# Patient Record
Sex: MALE | Race: WHITE | HISPANIC OR LATINO | Employment: FULL TIME | URBAN - METROPOLITAN AREA
[De-identification: names, ages, dates, MRNs, and addresses within clinical notes are randomized per-mention and may not be internally consistent; named-entity substitution may affect disease eponyms.]

---

## 2021-07-31 ENCOUNTER — HOSPITAL ENCOUNTER (INPATIENT)
Facility: HOSPITAL | Age: 41
LOS: 3 days | Discharge: HOME | DRG: 440 | End: 2021-08-03
Attending: EMERGENCY MEDICINE | Admitting: EMERGENCY MEDICINE
Payer: COMMERCIAL

## 2021-07-31 ENCOUNTER — TRANSFERRED RECORDS (OUTPATIENT)
Dept: HEALTH INFORMATION MANAGEMENT | Facility: CLINIC | Age: 41
End: 2021-07-31

## 2021-07-31 DIAGNOSIS — E11.9 NEW ONSET TYPE 2 DIABETES MELLITUS (H): Primary | ICD-10-CM

## 2021-07-31 PROBLEM — K85.90 PANCREATITIS: Status: ACTIVE | Noted: 2021-07-31

## 2021-07-31 LAB — HBA1C MFR BLD: 9.2 %

## 2021-07-31 PROCEDURE — 99223 1ST HOSP IP/OBS HIGH 75: CPT | Performed by: EMERGENCY MEDICINE

## 2021-07-31 PROCEDURE — 258N000003 HC RX IP 258 OP 636: Performed by: EMERGENCY MEDICINE

## 2021-07-31 PROCEDURE — 250N000011 HC RX IP 250 OP 636: Performed by: EMERGENCY MEDICINE

## 2021-07-31 PROCEDURE — 120N000001 HC R&B MED SURG/OB

## 2021-07-31 PROCEDURE — 36415 COLL VENOUS BLD VENIPUNCTURE: CPT | Performed by: EMERGENCY MEDICINE

## 2021-07-31 PROCEDURE — 83036 HEMOGLOBIN GLYCOSYLATED A1C: CPT | Performed by: EMERGENCY MEDICINE

## 2021-07-31 RX ORDER — ONDANSETRON 4 MG/1
4 TABLET, ORALLY DISINTEGRATING ORAL EVERY 6 HOURS PRN
Status: DISCONTINUED | OUTPATIENT
Start: 2021-07-31 | End: 2021-08-03 | Stop reason: HOSPADM

## 2021-07-31 RX ORDER — ROSUVASTATIN CALCIUM 20 MG/1
20 TABLET, COATED ORAL DAILY
COMMUNITY
Start: 2021-07-22

## 2021-07-31 RX ORDER — NALOXONE HYDROCHLORIDE 0.4 MG/ML
0.2 INJECTION, SOLUTION INTRAMUSCULAR; INTRAVENOUS; SUBCUTANEOUS
Status: DISCONTINUED | OUTPATIENT
Start: 2021-07-31 | End: 2021-08-03 | Stop reason: HOSPADM

## 2021-07-31 RX ORDER — FENOFIBRATE 145 MG/1
145 TABLET, COATED ORAL
COMMUNITY
Start: 2021-07-22

## 2021-07-31 RX ORDER — MORPHINE SULFATE 4 MG/ML
4 INJECTION, SOLUTION INTRAMUSCULAR; INTRAVENOUS
Status: DISCONTINUED | OUTPATIENT
Start: 2021-07-31 | End: 2021-08-01

## 2021-07-31 RX ORDER — NALOXONE HYDROCHLORIDE 0.4 MG/ML
0.4 INJECTION, SOLUTION INTRAMUSCULAR; INTRAVENOUS; SUBCUTANEOUS
Status: DISCONTINUED | OUTPATIENT
Start: 2021-07-31 | End: 2021-08-03 | Stop reason: HOSPADM

## 2021-07-31 RX ORDER — SODIUM CHLORIDE 9 MG/ML
INJECTION, SOLUTION INTRAVENOUS CONTINUOUS
Status: DISCONTINUED | OUTPATIENT
Start: 2021-07-31 | End: 2021-08-03 | Stop reason: ALTCHOICE

## 2021-07-31 RX ORDER — CHLORTHALIDONE 25 MG/1
25 TABLET ORAL DAILY
COMMUNITY
Start: 2021-07-22

## 2021-07-31 RX ORDER — KETOROLAC TROMETHAMINE 15 MG/ML
15 INJECTION, SOLUTION INTRAMUSCULAR; INTRAVENOUS EVERY 6 HOURS PRN
Status: DISPENSED | OUTPATIENT
Start: 2021-07-31 | End: 2021-08-01

## 2021-07-31 RX ORDER — LORATADINE 10 MG/1
10 TABLET ORAL DAILY
COMMUNITY

## 2021-07-31 RX ORDER — LIDOCAINE 40 MG/G
CREAM TOPICAL
Status: DISCONTINUED | OUTPATIENT
Start: 2021-07-31 | End: 2021-08-03 | Stop reason: HOSPADM

## 2021-07-31 RX ORDER — DEXTROSE MONOHYDRATE 25 G/50ML
25-50 INJECTION, SOLUTION INTRAVENOUS
Status: DISCONTINUED | OUTPATIENT
Start: 2021-07-31 | End: 2021-08-03 | Stop reason: HOSPADM

## 2021-07-31 RX ORDER — LOSARTAN POTASSIUM 50 MG/1
50 TABLET ORAL DAILY
COMMUNITY
Start: 2021-06-03

## 2021-07-31 RX ORDER — NICOTINE POLACRILEX 4 MG
15-30 LOZENGE BUCCAL
Status: DISCONTINUED | OUTPATIENT
Start: 2021-07-31 | End: 2021-08-03 | Stop reason: HOSPADM

## 2021-07-31 RX ORDER — CHLORAL HYDRATE 500 MG
2 CAPSULE ORAL 2 TIMES DAILY
COMMUNITY
Start: 2021-03-01

## 2021-07-31 RX ORDER — ONDANSETRON 2 MG/ML
4 INJECTION INTRAMUSCULAR; INTRAVENOUS EVERY 6 HOURS PRN
Status: DISCONTINUED | OUTPATIENT
Start: 2021-07-31 | End: 2021-08-03 | Stop reason: HOSPADM

## 2021-07-31 RX ADMIN — SODIUM CHLORIDE: 9 INJECTION, SOLUTION INTRAVENOUS at 22:39

## 2021-07-31 RX ADMIN — SODIUM CHLORIDE 1000 ML: 9 INJECTION, SOLUTION INTRAVENOUS at 21:07

## 2021-07-31 RX ADMIN — SODIUM CHLORIDE: 9 INJECTION, SOLUTION INTRAVENOUS at 20:40

## 2021-07-31 RX ADMIN — KETOROLAC TROMETHAMINE 15 MG: 15 INJECTION, SOLUTION INTRAMUSCULAR; INTRAVENOUS at 22:36

## 2021-07-31 RX ADMIN — MORPHINE SULFATE 4 MG: 4 INJECTION, SOLUTION INTRAMUSCULAR; INTRAVENOUS at 20:37

## 2021-07-31 ASSESSMENT — ACTIVITIES OF DAILY LIVING (ADL)
DIFFICULTY_EATING/SWALLOWING: NO
WEAR_GLASSES_OR_BLIND: NO
DOING_ERRANDS_INDEPENDENTLY_DIFFICULTY: NO
WALKING_OR_CLIMBING_STAIRS_DIFFICULTY: NO
HEARING_DIFFICULTY_OR_DEAF: NO
DIFFICULTY_COMMUNICATING: NO
DRESSING/BATHING_DIFFICULTY: NO
FALL_HISTORY_WITHIN_LAST_SIX_MONTHS: NO
PATIENT_/_FAMILY_COMMUNICATION_STYLE: SPOKEN LANGUAGE (ENGLISH OR BILINGUAL)
CONCENTRATING,_REMEMBERING_OR_MAKING_DECISIONS_DIFFICULTY: NO
TOILETING_ISSUES: NO

## 2021-07-31 ASSESSMENT — MIFFLIN-ST. JEOR: SCORE: 1988.99

## 2021-07-31 NOTE — LETTER
[address] [address]  Phone: [phone]  Fax: [phone]    August 3, 2021        Ever Guerrero Jr.  [address]  [place] [address]          To whom it may concern:    RE: Ever Guerrero Jr.    Patient was hospitalized 7/31/2021-8/3/2021 and missed work.  Patient may return to work immediately with the following:  No working or lifting restrictions    Please contact me for questions or concerns.      Sincerely,        No name on file.

## 2021-08-01 ENCOUNTER — APPOINTMENT (OUTPATIENT)
Dept: ULTRASOUND IMAGING | Facility: HOSPITAL | Age: 41
DRG: 440 | End: 2021-08-01
Attending: EMERGENCY MEDICINE
Payer: COMMERCIAL

## 2021-08-01 PROBLEM — E11.9 NEW ONSET TYPE 2 DIABETES MELLITUS (H): Status: ACTIVE | Noted: 2021-08-01

## 2021-08-01 PROBLEM — F10.10 ALCOHOL ABUSE: Status: ACTIVE | Noted: 2021-08-01

## 2021-08-01 PROBLEM — I10 HYPERTENSION: Status: ACTIVE | Noted: 2021-03-01

## 2021-08-01 PROBLEM — D72.829 LEUKOCYTOSIS, UNSPECIFIED TYPE: Status: ACTIVE | Noted: 2021-08-01

## 2021-08-01 LAB
ALBUMIN SERPL-MCNC: 3.1 G/DL (ref 3.5–5)
ALP SERPL-CCNC: 75 U/L (ref 45–120)
ALT SERPL W P-5'-P-CCNC: 34 U/L (ref 0–45)
ANION GAP SERPL CALCULATED.3IONS-SCNC: 18 MMOL/L (ref 5–18)
AST SERPL W P-5'-P-CCNC: ABNORMAL U/L
BILIRUB SERPL-MCNC: 0.8 MG/DL (ref 0–1)
BUN SERPL-MCNC: 8 MG/DL (ref 8–22)
CALCIUM SERPL-MCNC: 8.4 MG/DL (ref 8.5–10.5)
CHLORIDE BLD-SCNC: 97 MMOL/L (ref 98–107)
CO2 SERPL-SCNC: 14 MMOL/L (ref 22–31)
CREAT SERPL-MCNC: ABNORMAL MG/DL
ERYTHROCYTE [DISTWIDTH] IN BLOOD BY AUTOMATED COUNT: 13.6 % (ref 10–15)
FASTING STATUS PATIENT QL REPORTED: ABNORMAL
GFR SERPL CREATININE-BSD FRML MDRD: ABNORMAL ML/MIN/{1.73_M2}
GLUCOSE BLD-MCNC: 215 MG/DL (ref 70–125)
GLUCOSE BLDC GLUCOMTR-MCNC: 173 MG/DL (ref 70–125)
GLUCOSE BLDC GLUCOMTR-MCNC: 205 MG/DL (ref 70–125)
GLUCOSE BLDC GLUCOMTR-MCNC: 227 MG/DL (ref 70–125)
GLUCOSE BLDC GLUCOMTR-MCNC: 247 MG/DL (ref 70–125)
HCT VFR BLD AUTO: 42.3 % (ref 40–53)
HGB BLD-MCNC: 15.3 G/DL (ref 13.3–17.7)
HOLD SPECIMEN: NORMAL
HOLD SPECIMEN: NORMAL
LIPASE SERPL-CCNC: 76 U/L (ref 0–52)
MCH RBC QN AUTO: 30.4 PG (ref 26.5–33)
MCHC RBC AUTO-ENTMCNC: 36.2 G/DL (ref 31.5–36.5)
MCV RBC AUTO: 84 FL (ref 78–100)
PLATELET # BLD AUTO: 238 10E3/UL (ref 150–450)
POTASSIUM BLD-SCNC: ABNORMAL MMOL/L
PROT SERPL-MCNC: ABNORMAL G/DL
RBC # BLD AUTO: 5.04 10E6/UL (ref 4.4–5.9)
SODIUM SERPL-SCNC: 129 MMOL/L (ref 136–145)
TRIGL SERPL-MCNC: 2405 MG/DL
WBC # BLD AUTO: 14.8 10E3/UL (ref 4–11)

## 2021-08-01 PROCEDURE — 99233 SBSQ HOSP IP/OBS HIGH 50: CPT | Performed by: INTERNAL MEDICINE

## 2021-08-01 PROCEDURE — C9113 INJ PANTOPRAZOLE SODIUM, VIA: HCPCS | Performed by: INTERNAL MEDICINE

## 2021-08-01 PROCEDURE — 84478 ASSAY OF TRIGLYCERIDES: CPT | Performed by: INTERNAL MEDICINE

## 2021-08-01 PROCEDURE — 85027 COMPLETE CBC AUTOMATED: CPT | Performed by: EMERGENCY MEDICINE

## 2021-08-01 PROCEDURE — 36415 COLL VENOUS BLD VENIPUNCTURE: CPT | Performed by: INTERNAL MEDICINE

## 2021-08-01 PROCEDURE — 76705 ECHO EXAM OF ABDOMEN: CPT

## 2021-08-01 PROCEDURE — 999N000157 HC STATISTIC RCP TIME EA 10 MIN

## 2021-08-01 PROCEDURE — 250N000012 HC RX MED GY IP 250 OP 636 PS 637: Performed by: EMERGENCY MEDICINE

## 2021-08-01 PROCEDURE — 83690 ASSAY OF LIPASE: CPT | Performed by: EMERGENCY MEDICINE

## 2021-08-01 PROCEDURE — 82374 ASSAY BLOOD CARBON DIOXIDE: CPT | Performed by: EMERGENCY MEDICINE

## 2021-08-01 PROCEDURE — 258N000003 HC RX IP 258 OP 636: Performed by: INTERNAL MEDICINE

## 2021-08-01 PROCEDURE — 36415 COLL VENOUS BLD VENIPUNCTURE: CPT | Performed by: EMERGENCY MEDICINE

## 2021-08-01 PROCEDURE — 120N000001 HC R&B MED SURG/OB

## 2021-08-01 PROCEDURE — 250N000011 HC RX IP 250 OP 636: Performed by: INTERNAL MEDICINE

## 2021-08-01 PROCEDURE — 250N000011 HC RX IP 250 OP 636: Performed by: EMERGENCY MEDICINE

## 2021-08-01 PROCEDURE — 84075 ASSAY ALKALINE PHOSPHATASE: CPT | Performed by: EMERGENCY MEDICINE

## 2021-08-01 RX ORDER — HYDRALAZINE HYDROCHLORIDE 20 MG/ML
10 INJECTION INTRAMUSCULAR; INTRAVENOUS EVERY 4 HOURS PRN
Status: DISCONTINUED | OUTPATIENT
Start: 2021-08-01 | End: 2021-08-03 | Stop reason: HOSPADM

## 2021-08-01 RX ORDER — MORPHINE SULFATE 2 MG/ML
1-2 INJECTION, SOLUTION INTRAMUSCULAR; INTRAVENOUS EVERY 4 HOURS PRN
Status: DISCONTINUED | OUTPATIENT
Start: 2021-08-01 | End: 2021-08-03 | Stop reason: HOSPADM

## 2021-08-01 RX ORDER — BISACODYL 10 MG
10 SUPPOSITORY, RECTAL RECTAL DAILY PRN
Status: DISCONTINUED | OUTPATIENT
Start: 2021-08-01 | End: 2021-08-03 | Stop reason: HOSPADM

## 2021-08-01 RX ADMIN — MORPHINE SULFATE 4 MG: 4 INJECTION, SOLUTION INTRAMUSCULAR; INTRAVENOUS at 00:59

## 2021-08-01 RX ADMIN — MORPHINE SULFATE 4 MG: 4 INJECTION, SOLUTION INTRAMUSCULAR; INTRAVENOUS at 06:02

## 2021-08-01 RX ADMIN — MORPHINE SULFATE 4 MG: 4 INJECTION, SOLUTION INTRAMUSCULAR; INTRAVENOUS at 03:17

## 2021-08-01 RX ADMIN — MORPHINE SULFATE 4 MG: 4 INJECTION, SOLUTION INTRAMUSCULAR; INTRAVENOUS at 08:11

## 2021-08-01 RX ADMIN — INSULIN ASPART 1 UNITS: 100 INJECTION, SOLUTION INTRAVENOUS; SUBCUTANEOUS at 08:20

## 2021-08-01 RX ADMIN — KETOROLAC TROMETHAMINE 15 MG: 15 INJECTION, SOLUTION INTRAMUSCULAR; INTRAVENOUS at 12:08

## 2021-08-01 RX ADMIN — ENOXAPARIN SODIUM 40 MG: 100 INJECTION SUBCUTANEOUS at 13:47

## 2021-08-01 RX ADMIN — PANTOPRAZOLE SODIUM 40 MG: 40 INJECTION, POWDER, FOR SOLUTION INTRAVENOUS at 10:03

## 2021-08-01 RX ADMIN — SODIUM CHLORIDE: 9 INJECTION, SOLUTION INTRAVENOUS at 23:19

## 2021-08-01 RX ADMIN — MORPHINE SULFATE 2 MG: 2 INJECTION, SOLUTION INTRAMUSCULAR; INTRAVENOUS at 23:19

## 2021-08-01 RX ADMIN — KETOROLAC TROMETHAMINE 15 MG: 15 INJECTION, SOLUTION INTRAMUSCULAR; INTRAVENOUS at 18:15

## 2021-08-01 NOTE — PLAN OF CARE
Problem: Pain (Pancreatitis)  Goal: Acceptable Pain Control  Outcome: Change based on patient need/priority  Intervention: Monitor and Manage Pain  Recent Flowsheet Documentation  Taken 7/31/2021 1954 by Norma Rizzo RN  Pain Management Interventions: (new admit to the floor) MD notified (comment)   Patient has been using morphine and toradol for discomfort over night.  Rating discomfort mostly 8/10.  With relief noted by sleep and decreased pain level.  Did get US per ordered this AM.

## 2021-08-01 NOTE — PROVIDER NOTIFICATION
"Patient's own CPAP set-up and ready to go. Patient not quit ready for bed. Stated he'll put interface on himself. Encouraged him to call if assistance is needed.        08/01/21 0011   Home O2/Equipment Set-Up   Home O2 Device CPAP   Pt Owned Device Yes;Clean;Functional;Pt. Demonstrates Use     BP (!) 155/88   Pulse 97   Temp 99.1  F (37.3  C) (Oral)   Resp 18   Ht 1.727 m (5' 8\")   Wt 111.4 kg (245 lb 11.2 oz)   SpO2 96%   BMI 37.36 kg/m       Popeye Scott, RRT  "

## 2021-08-01 NOTE — H&P
"ADMISSION HISTORY & PHYSICAL      Aga Perez, [phone]  ASSESSMENT AND PLAN:  40 year old male transferred from the emergency room for admission for pancreatitis.    1.  Pancreatitis: Seen on CT scan with peripancreatic stranding per emergency room provider.  Currently I cannot access the CT report-lipase elevated in the mid 200s.  History of alcoholic pancreatitis but denies recent abuse but states he \"did have a sip of my wife's beer the other day \".  Will check serum triglycerides and gallbladder ultrasound.  IV fluids, trend labs, n.p.o. for now and advance diet as tolerated.    2.  GABRIELLA: May use home CPAP    3.  Hyperglycemia: No diagnosis of diabetes, blood sugars were in the 200s and he had glucose urea.  Will check A1c and initiate diabetic order set.    4.  History of hypertension: Awaiting med rec.    Barriers to discharge: Pancreatitis, n.p.o., IV fluids/meds      CHIEF COMPLAINT:  Abdominal pain    HISTORY OF PRESENTING ILLNESS:  Ever Guerrero is a 40 year old male with history significant for alcohol induced pancreatitis who presented to the emergency room with epigastric/left upper quadrant abdominal pain which woke him up early yesterday morning.  It is constant with no modifying factors other than some pain medicine he received in the urgency room.  No nausea or vomiting.  He had similar presentations in the past with alcoholic pancreatitis.  He states that the only recent alcohol he drank was a couple days ago he \"took a sip of my wife's beer \".  No fevers or chills, no chest pain or shortness of breath.    PMH/PSH:  Patient Active Problem List   Diagnosis     Pancreatitis       ALLERGIES:  Allergies   Allergen Reactions     Amoxicillin Unknown       MEDICATIONS:  Reviewed.  Current Facility-Administered Medications   Medication     0.9% sodium chloride BOLUS     ketorolac (TORADOL) injection 15 mg     lidocaine (LMX4) cream     lidocaine 1 % 0.1-1 mL     melatonin tablet 1 mg     morphine " (PF) injection 4 mg     naloxone (NARCAN) injection 0.2 mg    Or     naloxone (NARCAN) injection 0.4 mg    Or     naloxone (NARCAN) injection 0.2 mg    Or     naloxone (NARCAN) injection 0.4 mg     ondansetron (ZOFRAN-ODT) ODT tab 4 mg    Or     ondansetron (ZOFRAN) injection 4 mg     sodium chloride (PF) 0.9% PF flush 3 mL     sodium chloride (PF) 0.9% PF flush 3 mL     sodium chloride 0.9% infusion       SOCIAL HISTORY:  Social History     Socioeconomic History     Marital status: Single     Spouse name: Not on file     Number of children: Not on file     Years of education: Not on file     Highest education level: Not on file   Occupational History     Not on file   Tobacco Use     Smoking status: Not on file   Substance and Sexual Activity     Alcohol use: Not on file     Drug use: Not on file     Sexual activity: Not on file   Other Topics Concern     Not on file   Social History Narrative     Not on file     Social Determinants of Health     Financial Resource Strain:      Difficulty of Paying Living Expenses:    Food Insecurity:      Worried About Running Out of Food in the Last Year:      Ran Out of Food in the Last Year:    Transportation Needs:      Lack of Transportation (Medical):      Lack of Transportation (Non-Medical):    Physical Activity:      Days of Exercise per Week:      Minutes of Exercise per Session:    Stress:      Feeling of Stress :    Social Connections:      Frequency of Communication with Friends and Family:      Frequency of Social Gatherings with Friends and Family:      Attends Amish Services:      Active Member of Clubs or Organizations:      Attends Club or Organization Meetings:      Marital Status:    Intimate Partner Violence:      Fear of Current or Ex-Partner:      Emotionally Abused:      Physically Abused:      Sexually Abused:        FAMILY HISTORY:  Reviewed and found to be not pertinent to presenting complaint    ROS:  12 point ROS was performed and found to be  "negative except for the pertinent positives mentioned in the HPI.      PHYSICAL EXAM:  BP (!) 155/88   Pulse 97   Temp 99.1  F (37.3  C) (Oral)   Resp 18   Ht 1.727 m (5' 8\")   Wt 111.4 kg (245 lb 11.2 oz)   SpO2 96%   BMI 37.36 kg/m    No intake/output data recorded.  No intake/output data recorded.  CONSTITUTIONAL: No apparent distress  HEENT: Dry mouth      Sclera- anicteric        LUNGS: Clear to auscultation bilaterally  CARDIOVASCULAR: S1S2 regular. No murmurs, rubs or gallops,no pedal edema  GI: Soft.  Mild bilateral upper quadrant tenderness no organomegaly. No guarding or rigidity. Bowel sounds- active  NEURO: Cranial nerves II-XII grossly intact. No focal neurological deficit. No involuntary movements  INTGM: No skin rash, no cyanosis or clubbing, no jaundice  LYMPH: no adenopathy  MSK: no joint swelling or tenderness  PSYCH: alert and oriented x 3, no agitation      DIAGNOSTIC DATA:  No results found for this or any previous visit (from the past 24 hour(s)).  All lab studies reviewed personally white count elevated at 16.5, hemoglobin 18.2, sodium 131, anion gap was 20, blood glucose in the 200s, troponin negative and lipase 229.    No results found.  Radiology report reviewed with abdominal CT showing pancreatitis.        Paramjit Santiago MD  [facility] Medicine Service  "

## 2021-08-01 NOTE — PROGRESS NOTES
[facility]    PROGRESS NOTE - Hospitalist Service    Assessment and Plan    Active Problems:    Pancreatitis    Hypertension    Hypertriglyceridemia    Esophageal reflux    Alcohol abuse    New onset type 2 diabetes mellitus (H)    Leukocytosis, unspecified type    Ever Guerrero Jr. is a 40 year old old male with acute pancreatitis transferred from       Pancreatitis: CT, abdomen pelvis w/: Changes of acute interstitial pancreatitis involving the pancreatic tail.  Hepatomegaly with fatty infiltration of the liver. Tiny nonobstructing stone within the upper pole of the left kidney.  -lipase at , elevated WBC 16.5  - NPO, bowel rest  - aggressive IVF   - pain control  - h/o hypertriglyceridemia last trigs 992 in march  - alcohol use cessation discussed      GABRIELLA: May use home CPAP     Hyperglycemia: No diagnosis of diabetes, blood sugars were in the 200s and he had glucose urea.   - HgA1c 9.2, new diagnosis of Diabetes, patient understands  - novolog sliding scale Q4hrs while NPO  - hold on DNE for now until diet ordered given no plan yet created for insulin regiment due to NPO    hypertrig  - checking trigs  - last was 993 in march     GERD  - IV protonix      History of hypertension:   - NPO  - IV hydralazine prn for now     Leukocytosis   - trend   - IVF   - no abx for now     VTE prophylaxis:  Pneumatic Compression Devices and Lovenox   DIET: Orders Placed This Encounter      NPO for Medical/Clinical Reasons Except for: Ice Chips, Meds    Drains/Lines: none  Weight bearing status: WBAT  Disposition/Barriers to discharge: pending improvement   Code Status: Full Code    Subjective:  Pain is improving but feels bloated and not passing flatus     PHYSICAL EXAM  Vitals:    07/31/21 1954   Weight: 111.4 kg (245 lb 11.2 oz)     B/P:126/61 T:98.7 P:90 R:20     Intake/Output Summary (Last 24 hours) at 8/1/2021 0909  Last data filed at 8/1/2021 0625  Gross per 24 hour   Intake 0 ml    Output --   Net 0 ml        Constitutional: awake, alert, cooperative, no apparent distress, and appears stated age  Eyes: Lids and lashes normal, pupils equal,  extra ocular muscles intact, sclera clear, conjunctiva normal  ENT: Normocephalic, without obvious abnormality, atraumatic, sinuses nontender on palpation,   Respiratory: No increased work of breathing, good air exchange, clear to auscultation bilaterally, no crackles or wheezing  Cardiovascular: Normal apical impulse, regular rate and rhythm, normal S1 and S2, no S3 or S4, and no murmur noted  GI: distended, +BS, TTP   Skin: no bruising or bleeding, normal skin color, texture, turgor and no redness, warmth, or swelling  Musculoskeletal: There is no redness, warmth, or swelling of the joints.  Full range of motion noted.  Motor strength normal  Neurologic: Awake, alert, oriented to name, place and time.  Cranial nerves II-XII are grossly intact.  Motor normal  Neuropsychiatric: General: normal, calm and normal eye contact      PERTINENT LABS/IMAGING:  Recent Labs   Lab 08/01/21  1155 08/01/21  0940 08/01/21  0815 08/01/21  0656   WBC  --   --   --  14.8*   HGB  --   --   --  15.3   MCV  --   --   --  84   PLT  --   --   --  238   NA  --  129*  --   --    CHLORIDE  --  97*  --   --    CO2  --  14*  --   --    BUN  --  8  --   --    ANIONGAP  --  18  --   --    FACUNDO  --  8.4*  --   --    * 215* 227*  --    ALBUMIN  --  3.1*  --   --    BILITOTAL  --  0.8  --   --    ALKPHOS  --  75  --   --    ALT  --  34  --   --    LIPASE  --  76*  --   --      Recent Results (from the past 24 hour(s))   US Abdomen Limited    Narrative    EXAM: US ABDOMEN LIMITED  LOCATION: [facility]  DATE/TIME: 8/1/2021 5:27 AM    INDICATION: evaluate gallbladder  COMPARISON: None.  TECHNIQUE: Limited abdominal ultrasound.    FINDINGS:    GALLBLADDER: Normal. No gallstones, wall thickening, or pericholecystic fluid. Negative sonographic Graf's  sign.    BILE DUCTS: No biliary dilatation. The common duct measures 6 mm.    LIVER: Increased echogenicity from diffuse fatty infiltration with small areas of sparing in the gallbladder fossa. No focal mass.    RIGHT KIDNEY: No hydronephrosis.    PANCREAS: The pancreas is largely obscured by overlying gas.    No ascites.      Impression    IMPRESSION:    1.  No cholelithiasis or biliary sludge. No ultrasound findings to suggest acute gallbladder inflammation.  2.  Fatty infiltration of the liver with sparing near the gallbladder fossa.           Melia Waldron MD  [facility] Medicine Service  [phone]

## 2021-08-01 NOTE — PHARMACY-ADMISSION MEDICATION HISTORY
Pharmacy Note - Admission Medication History       ______________________________________________________________________    Prior To Admission (PTA) med list completed and updated in EMR.       PTA Med List   Medication Sig Last Dose     chlorthalidone (HYGROTON) 25 MG tablet Take 25 mg by mouth daily 7/30/2021     fenofibrate (TRICOR) 145 MG tablet Take 145 mg by mouth daily before breakfast 7/30/2021     fish oil-omega-3 fatty acids 1000 MG capsule Take 2 g by mouth 2 times daily 7/30/2021     loratadine (CLARITIN) 10 MG tablet Take 10 mg by mouth daily 7/30/2021     losartan (COZAAR) 50 MG tablet Take 50 mg by mouth daily 7/30/2021     omeprazole (PRILOSEC) 20 MG DR capsule Take 20 mg by mouth daily before breakfast 7/30/2021     rosuvastatin (CRESTOR) 20 MG tablet Take 20 mg by mouth daily 7/30/2021       [facility]  Method of interview communication: in-person    Summary of Changes to PTA Med List  -All meds added new to system    Patient was asked about OTC/herbal products specifically.  PTA med list reflects this.    In the past week, patient estimated taking medication this percent of the time:  greater than 90%.    Allergies were reviewed, assessed, and updated with the patient.      Patient does not use any multi-dose medications prior to admission.    The information provided in this note is only as accurate as the sources available at the time of the update(s).    Thank you for the opportunity to participate in the care of this patient.    Oh Rob [facility]  7/31/2021 10:07 PM

## 2021-08-01 NOTE — PLAN OF CARE
Problem: Pain (Pancreatitis)  Goal: Acceptable Pain Control  Outcome: Improving  Intervention: Monitor and Manage Pain  Relies continues to have pain in upper mid abdomen that radiates to back. Utilizing PRN IV pain medications, which has been making pain tolerable per patient.    Problem: Adult Inpatient Plan of Care  Goal: Plan of Care Review  Outcome: Improving  Remains NPO except ice chips. IVF running at 150 ml/hr. IV Protonix.     Problem: Diabetes Comorbidity  Goal: Blood Glucose Level Within Targeted Range  Outcome: Improving   this morning. NPO. Sliding scale Novolog. Sticky note left for MD to assess need for DM educator.

## 2021-08-02 LAB
ALBUMIN SERPL-MCNC: 2.7 G/DL (ref 3.5–5)
ALP SERPL-CCNC: 67 U/L (ref 45–120)
ALT SERPL W P-5'-P-CCNC: 26 U/L (ref 0–45)
ANION GAP SERPL CALCULATED.3IONS-SCNC: 14 MMOL/L (ref 5–18)
AST SERPL W P-5'-P-CCNC: ABNORMAL U/L
BILIRUB SERPL-MCNC: 0.8 MG/DL (ref 0–1)
BUN SERPL-MCNC: 9 MG/DL (ref 8–22)
CALCIUM SERPL-MCNC: 8.6 MG/DL (ref 8.5–10.5)
CHLORIDE BLD-SCNC: 102 MMOL/L (ref 98–107)
CO2 SERPL-SCNC: 21 MMOL/L (ref 22–31)
CREAT SERPL-MCNC: 0.87 MG/DL (ref 0.7–1.3)
GFR SERPL CREATININE-BSD FRML MDRD: >90 ML/MIN/1.73M2
GLUCOSE BLD-MCNC: 156 MG/DL (ref 70–125)
GLUCOSE BLDC GLUCOMTR-MCNC: 135 MG/DL (ref 70–125)
GLUCOSE BLDC GLUCOMTR-MCNC: 143 MG/DL (ref 70–125)
GLUCOSE BLDC GLUCOMTR-MCNC: 166 MG/DL (ref 70–125)
GLUCOSE BLDC GLUCOMTR-MCNC: 174 MG/DL (ref 70–125)
GLUCOSE BLDC GLUCOMTR-MCNC: 176 MG/DL (ref 70–125)
GLUCOSE BLDC GLUCOMTR-MCNC: 180 MG/DL (ref 70–125)
HOLD SPECIMEN: NORMAL
POTASSIUM BLD-SCNC: ABNORMAL MMOL/L
PROT SERPL-MCNC: 7.2 G/DL (ref 6–8)
SODIUM SERPL-SCNC: 137 MMOL/L (ref 136–145)

## 2021-08-02 PROCEDURE — 36415 COLL VENOUS BLD VENIPUNCTURE: CPT | Performed by: INTERNAL MEDICINE

## 2021-08-02 PROCEDURE — 258N000003 HC RX IP 258 OP 636: Performed by: INTERNAL MEDICINE

## 2021-08-02 PROCEDURE — 250N000011 HC RX IP 250 OP 636: Performed by: INTERNAL MEDICINE

## 2021-08-02 PROCEDURE — 250N000013 HC RX MED GY IP 250 OP 250 PS 637: Performed by: INTERNAL MEDICINE

## 2021-08-02 PROCEDURE — 99232 SBSQ HOSP IP/OBS MODERATE 35: CPT | Performed by: INTERNAL MEDICINE

## 2021-08-02 PROCEDURE — 82374 ASSAY BLOOD CARBON DIOXIDE: CPT | Performed by: INTERNAL MEDICINE

## 2021-08-02 PROCEDURE — 84155 ASSAY OF PROTEIN SERUM: CPT | Performed by: INTERNAL MEDICINE

## 2021-08-02 PROCEDURE — C9113 INJ PANTOPRAZOLE SODIUM, VIA: HCPCS | Performed by: INTERNAL MEDICINE

## 2021-08-02 PROCEDURE — 120N000001 HC R&B MED SURG/OB

## 2021-08-02 RX ORDER — FENOFIBRATE 145 MG/1
145 TABLET, COATED ORAL
Status: DISCONTINUED | OUTPATIENT
Start: 2021-08-03 | End: 2021-08-02 | Stop reason: CLARIF

## 2021-08-02 RX ORDER — AMOXICILLIN 250 MG
1 CAPSULE ORAL 2 TIMES DAILY PRN
Status: DISCONTINUED | OUTPATIENT
Start: 2021-08-02 | End: 2021-08-03 | Stop reason: HOSPADM

## 2021-08-02 RX ORDER — LANOLIN ALCOHOL/MO/W.PET/CERES
3 CREAM (GRAM) TOPICAL
Status: DISCONTINUED | OUTPATIENT
Start: 2021-08-02 | End: 2021-08-03 | Stop reason: HOSPADM

## 2021-08-02 RX ORDER — ACETAMINOPHEN 325 MG/1
650 TABLET ORAL EVERY 4 HOURS PRN
Status: DISCONTINUED | OUTPATIENT
Start: 2021-08-02 | End: 2021-08-03 | Stop reason: HOSPADM

## 2021-08-02 RX ORDER — OXYCODONE HYDROCHLORIDE 5 MG/1
5-10 TABLET ORAL EVERY 4 HOURS PRN
Status: DISCONTINUED | OUTPATIENT
Start: 2021-08-02 | End: 2021-08-03 | Stop reason: HOSPADM

## 2021-08-02 RX ORDER — FENOFIBRATE 145 MG/1
145 TABLET, COATED ORAL DAILY
Status: DISCONTINUED | OUTPATIENT
Start: 2021-08-02 | End: 2021-08-03 | Stop reason: HOSPADM

## 2021-08-02 RX ORDER — MAGNESIUM HYDROXIDE/ALUMINUM HYDROXICE/SIMETHICONE 120; 1200; 1200 MG/30ML; MG/30ML; MG/30ML
30 SUSPENSION ORAL EVERY 4 HOURS PRN
Status: DISCONTINUED | OUTPATIENT
Start: 2021-08-02 | End: 2021-08-03 | Stop reason: HOSPADM

## 2021-08-02 RX ORDER — BISACODYL 10 MG
10 SUPPOSITORY, RECTAL RECTAL DAILY PRN
Status: DISCONTINUED | OUTPATIENT
Start: 2021-08-02 | End: 2021-08-03 | Stop reason: HOSPADM

## 2021-08-02 RX ORDER — POLYETHYLENE GLYCOL 3350 17 G/17G
17 POWDER, FOR SOLUTION ORAL DAILY
Status: DISCONTINUED | OUTPATIENT
Start: 2021-08-02 | End: 2021-08-03 | Stop reason: HOSPADM

## 2021-08-02 RX ORDER — FENOFIBRATE 160 MG/1
160 TABLET ORAL DAILY
Status: DISCONTINUED | OUTPATIENT
Start: 2021-08-02 | End: 2021-08-02

## 2021-08-02 RX ADMIN — POLYETHYLENE GLYCOL 3350 17 G: 17 POWDER, FOR SOLUTION ORAL at 12:04

## 2021-08-02 RX ADMIN — ENOXAPARIN SODIUM 40 MG: 100 INJECTION SUBCUTANEOUS at 13:15

## 2021-08-02 RX ADMIN — SODIUM CHLORIDE: 9 INJECTION, SOLUTION INTRAVENOUS at 23:02

## 2021-08-02 RX ADMIN — SODIUM CHLORIDE: 9 INJECTION, SOLUTION INTRAVENOUS at 13:13

## 2021-08-02 RX ADMIN — ACETAMINOPHEN 325 MG: 325 SUPPOSITORY RECTAL at 01:08

## 2021-08-02 RX ADMIN — PANTOPRAZOLE SODIUM 40 MG: 40 INJECTION, POWDER, FOR SOLUTION INTRAVENOUS at 09:20

## 2021-08-02 RX ADMIN — ACETAMINOPHEN 650 MG: 325 TABLET ORAL at 20:56

## 2021-08-02 RX ADMIN — SODIUM CHLORIDE: 9 INJECTION, SOLUTION INTRAVENOUS at 18:19

## 2021-08-02 RX ADMIN — MORPHINE SULFATE 2 MG: 2 INJECTION, SOLUTION INTRAMUSCULAR; INTRAVENOUS at 04:36

## 2021-08-02 RX ADMIN — SODIUM CHLORIDE: 9 INJECTION, SOLUTION INTRAVENOUS at 06:07

## 2021-08-02 RX ADMIN — FENOFIBRATE 145 MG: 145 TABLET, COATED ORAL at 12:04

## 2021-08-02 NOTE — PLAN OF CARE
Problem: Adult Inpatient Plan of Care  Goal: Optimal Comfort and Wellbeing  Outcome: Improving  Pt reported bloating pain relieved after bowel movement. Reported 4 bowel movements today, stating the first 2 were larger than he expected them to be and the last one to be lose. Reported stool to be dark in color.   Pt encouraged to walk in hallway to assist in relieving bloating pain.

## 2021-08-02 NOTE — PROVIDER NOTIFICATION
"Patient already on own CPAP unit for the night and sleeping.      08/01/21 9099   Home O2/Equipment Set-Up   Home O2 Device CPAP   Pt Owned Device Yes;Clean;Functional;Pt. Demonstrates Use       /68 (BP Location: Left arm)   Pulse 88   Temp 99.4  F (37.4  C) (Oral)   Resp 18   Ht 1.727 m (5' 8\")   Wt 111.4 kg (245 lb 11.2 oz)   SpO2 96%   BMI 37.36 kg/m       Popeye Scott, RRT  "

## 2021-08-02 NOTE — PROGRESS NOTES
[facility]    PROGRESS NOTE - Hospitalist Service    Assessment and Plan    Active Problems:    Pancreatitis    Hypertension    Hypertriglyceridemia    Esophageal reflux    Alcohol abuse    New onset type 2 diabetes mellitus (H)    Leukocytosis, unspecified type    Ever Guerrero Jr. is a 40 year old old male with acute pancreatitis transferred from       Pancreatitis: CT, abdomen pelvis w/: Changes of acute interstitial pancreatitis involving the pancreatic tail.  Hepatomegaly with fatty infiltration of the liver. Tiny nonobstructing stone within the upper pole of the left kidney.  - lipase at , elevated WBC 16.5  - passing flatus and pain is significantly decreased will try clears, discussed with patient if having recurrent abd pain then will make NPO again   - aggressive IVF   - h/o hypertriglyceridemia last trigs 992 in march currently > 2,000  - alcohol use cessation discussed needs to refrain from all use  - GI consult appreciated  - continue fenofibrate already on maximum dose     GABRIELLA: May use home CPAP     Hyperglycemia: No diagnosis of diabetes, blood sugars were in the 200s and he had glucose urea.   - HgA1c 9.2, new diagnosis of Diabetes, patient understands  - novolog sliding scale Q4hrs while NPO change to TID with meals  - hold on DNE until tolerating solids     hypertrig- last was 993 in march   - finally returned late last night > 2,000  - resume fenofibrate   - no ETOH     GERD  - IV protonix for now until tolerating orals      History of hypertension:   - IV hydralazine prn for now     Leukocytosis   - trend   - IVF   - no abx for now     VTE prophylaxis:  Pneumatic Compression Devices and Lovenox   DIET: Orders Placed This Encounter      Combination Diet Clear Liquid; Consistent Carb 60 Grams CHO per Meal Diet; Low Fat Diet  Drains/Lines: none  Weight bearing status: WBAT  Disposition/Barriers to discharge: pending improvement   Code Status: Full  Code    Subjective:  Pain is improving no longer bloated and passing flatussnow    PHYSICAL EXAM  Vitals:    07/31/21 1954   Weight: 111.4 kg (245 lb 11.2 oz)     B/P:126/61 T:98.7 P:90 R:20     Intake/Output Summary (Last 24 hours) at 8/1/2021 0947  Last data filed at 8/1/2021 0625  Gross per 24 hour   Intake 0 ml   Output --   Net 0 ml        Constitutional: awake, alert, cooperative, no apparent distress, and appears stated age  Eyes: Lids and lashes normal,   ENT: Normocephalic, without obvious abnormality, atraumatic,   Respiratory: No increased work of breathing, good air exchange, clear to auscultation bilaterally, no crackles or wheezing  Cardiovascular: Normal apical impulse, regular rate and rhythm, normal S1 and S2, no S3 or S4, and no murmur noted  GI: ND, +BS, minimal TTP   Skin: no bruising or bleeding, normal skin color, texture, turgor and no redness, warmth, or swelling  Musculoskeletal: There is no redness, warmth, or swelling of the joints.  Full range of motion noted.  Motor strength normal  Neurologic: Awake, alert, oriented to name, place and time.  Cranial nerves II-XII are grossly intact.  Motor normal  Neuropsychiatric: General: normal, calm and normal eye contact      PERTINENT LABS/IMAGING:  Recent Labs   Lab 08/02/21  0904 08/02/21  0637 08/02/21  0442 08/01/21  0940 08/01/21  0656   WBC  --   --   --   --  14.8*   HGB  --   --   --   --  15.3   MCV  --   --   --   --  84   PLT  --   --   --   --  238   NA  --  137  --  129*  --    CHLORIDE  --  102  --  97*  --    CO2  --  21*  --  14*  --    BUN  --  9  --  8  --    CR  --  0.87  --   --   --    ANIONGAP  --  14  --  18  --    FACUNDO  --  8.6  --  8.4*  --    * 156* 180* 215*  --    ALBUMIN  --  2.7*  --  3.1*  --    PROTTOTAL  --  7.2  --   --   --    BILITOTAL  --  0.8  --  0.8  --    ALKPHOS  --  67  --  75  --    ALT  --  26  --  34  --    LIPASE  --   --   --  76*  --      No results found for this or any previous visit (from  the past 24 hour(s)).    Melia Waldron MD  [facility] Medicine Service  [phone]

## 2021-08-02 NOTE — PLAN OF CARE
Patient continues to have abdominal pain. Endorses that pain location changes depending on how he is lying in bed. Pt also endorsed feeling chills. Temperature was 100.2. Tylenol suppository given with good effect. Recheck temperature was 98.8. prn morphine given x1 for abdominal pain rated 6/10. Patient is NPO, can have ice chips. BG Q4 with s/s insulin. I.v fluids continuous at 150 ml/hr. Pt is independent in room.

## 2021-08-02 NOTE — CONSULTS
Care Management Initial Consult    General Information  Assessment completed with: Patient,  (Ever)  Type of CM/SW Visit: Initial Assessment    Primary Care Provider verified and updated as needed: Yes     Readmission within the last 30 days:      Reason for Consult: discharge planning    Advance Care Planning: Advance Care Planning Reviewed: no concerns identified          Communication Assessment  Patient's communication style: spoken language (English or Bilingual)    Hearing Difficulty or Deaf: no   Wear Glasses or Blind: no    Cognitive  Cognitive/Neuro/Behavioral: WDL                      Living Environment:   People in home: significant other     Current living Arrangements: house      Able to return to prior arrangements: yes       Family/Social Support:  Care provided by: self  Provides care for:    Marital Status: Single  Significant Other          Description of Support System: Supportive    Support Assessment: Adequate social supports    Current Resources:   Patient receiving home care services: No     Community Resources: None  Equipment currently used at home: none  Supplies currently used at home: None    Employment/Financial:  Employment Status:          Financial Concerns:        Finance Comments: none (noone)    Lifestyle & Psychosocial Needs:  Social Determinants of Health     Tobacco Use:      Smoking Tobacco Use:      Smokeless Tobacco Use:    Alcohol Use:      Frequency of Alcohol Consumption:      Average Number of Drinks:      Frequency of Binge Drinking:    Financial Resource Strain:      Difficulty of Paying Living Expenses:    Food Insecurity:      Worried About Running Out of Food in the Last Year:      Ran Out of Food in the Last Year:    Transportation Needs:      Lack of Transportation (Medical):      Lack of Transportation (Non-Medical):    Physical Activity:      Days of Exercise per Week:      Minutes of Exercise per Session:    Stress:      Feeling of Stress :    Social  Connections:      Frequency of Communication with Friends and Family:      Frequency of Social Gatherings with Friends and Family:      Attends Orthodoxy Services:      Active Member of Clubs or Organizations:      Attends Club or Organization Meetings:      Marital Status:    Intimate Partner Violence:      Fear of Current or Ex-Partner:      Emotionally Abused:      Physically Abused:      Sexually Abused:    Depression:      PHQ-2 Score:    Housing Stability:      Unable to Pay for Housing in the Last Year:      Number of Places Lived in the Last Year:      Unstable Housing in the Last Year:        Functional Status:  Prior to admission patient needed assistance:   Dependent ADLs:: Independent          Mental Health Status:  Mental Health Status: No Current Concerns       Chemical Dependency Status:                Values/Beliefs:  Spiritual, Cultural Beliefs, Orthodoxy Practices, Values that affect care: no               Additional Information:    Assessed. Pr resides from home with significant other and independent at baseline. Pt reports he works full time job, he drive, he never uses any equipment before, he does not have any home health care. Pt reports his discharge goal is return to home when he is medically to stable. Pt significant other  to transport him at discharge.  Likely no CM needs at discharge. CM to continue following.    John Mcfarlane  MSW/LGSW/CM  8/2/21

## 2021-08-02 NOTE — PLAN OF CARE
Problem: Adult Inpatient Plan of Care  Goal: Plan of Care Review  Outcome: Improving   Pt calm and cooperative this shift. Ordered clear liquid diet for lunch, c/o some bloating pain after consuming broth and jello. Will continue to monitor.

## 2021-08-02 NOTE — CONSULTS
CONSULTING PHYSICIAN   Melia Waldron MD     REASON FOR CONSULTATION   Acute pancreatitis     IMPRESSION/RECOMMENDATIONS   Patient with previous history of acute pancreatitis, this would be his first recurrence.  He does have risk factors of underlying hypertriglyceridemia for which he is being treated (although he is unaware of recent lab values; last known triglycerides were 900).  Generally triglycerides over 1000 can precipitate acute pancreatitis (unclear if current triglycerides of 2400 are his baseline or result of his acute pancreatitis ).  Aggressive management of his hypertriglyceridemia as an outpatient will be important.  His recent alcohol use could also have stimulated his attack.  His overall alcohol intake is quite low, but he may need to completely abstain from alcohol to avoid recurrence.  No evidence of pancreatic necrosis on outside CT scan agree with current management (pain control, vigorous IV fluids).       HISTORY OF PRESENT ILLNESS   Ever Guerrero Jr. is a pleasant 40 year old male who we are asked to see in consultation by Dr. Waldron for pancreatitis.  The patient states that he has had a previous bout of pancreatitis nearly identical in presentation 3-1/2 years ago.  Since that time he has largely had no alcohol, perhaps 1 drink monthly (or less).  He did have 1 alcoholic beverage last week.  6 months ago he was noted to have elevated triglycerides, but is currently on treatment for them; he has not had this value rechecked.  He denies any new medications, no travel history or exposures.     PAST HISTORY   No past medical history on file.   No past surgical history on file.     Family History Social History   No family history on file. Social History     Socioeconomic History     Marital status: Single     Spouse name: Not on file     Number of children: Not on file     Years of education: Not on file     Highest education level: Not on  file   Occupational History     Not on file   Tobacco Use     Smoking status: Not on file   Substance and Sexual Activity     Alcohol use: Not on file     Drug use: Not on file     Sexual activity: Not on file   Other Topics Concern     Not on file   Social History Narrative     Not on file     Social Determinants of Health     Financial Resource Strain:      Difficulty of Paying Living Expenses:    Food Insecurity:      Worried About Running Out of Food in the Last Year:      Ran Out of Food in the Last Year:    Transportation Needs:      Lack of Transportation (Medical):      Lack of Transportation (Non-Medical):    Physical Activity:      Days of Exercise per Week:      Minutes of Exercise per Session:    Stress:      Feeling of Stress :    Social Connections:      Frequency of Communication with Friends and Family:      Frequency of Social Gatherings with Friends and Family:      Attends [employer] Services:      Active Member of Clubs or Organizations:      Attends Club or Organization Meetings:      Marital Status:    Intimate Partner Violence:      Fear of Current or Ex-Partner:      Emotionally Abused:      Physically Abused:      Sexually Abused:          MEDICATIONS & ALLERGIES   Medications Prior to Admission   Medication Sig Dispense Refill Last Dose     chlorthalidone (HYGROTON) 25 MG tablet Take 25 mg by mouth daily   7/30/2021     fenofibrate (TRICOR) 145 MG tablet Take 145 mg by mouth daily before breakfast   7/30/2021     fish oil-omega-3 fatty acids 1000 MG capsule Take 2 g by mouth 2 times daily   7/30/2021     loratadine (CLARITIN) 10 MG tablet Take 10 mg by mouth daily   7/30/2021     losartan (COZAAR) 50 MG tablet Take 50 mg by mouth daily   7/30/2021     omeprazole (PRILOSEC) 20 MG DR capsule Take 20 mg by mouth daily before breakfast   7/30/2021     rosuvastatin (CRESTOR) 20 MG tablet Take 20 mg by mouth daily   7/30/2021        ALLERGIES   Allergies   Allergen Reactions     Amoxicillin  "Diarrhea         REVIEW OF SYSTEMS     See HPI for pertinent positives and negatives. A comprehensive review of systems was performed and was otherwise noncontributory.     OBJECTIVE   Vitals Blood pressure 127/76, pulse 84, temperature 99.2  F (37.3  C), temperature source Oral, resp. rate 18, height 1.727 m (5' 8\"), weight 111.4 kg (245 lb 11.2 oz), SpO2 95 %.           Physical  Exam   GENERAL: alert and oriented, no acute disstress.     HEENT: atraumatic, anicteric, moist mucous membranes, neck soft/supple     PULMONARY: normal resp effort, breath sounds clear to auscultation bilaterally    CARDIOVASCULAR: normal rate and rhythm, no murmurs    ABDOMEN: soft, no tenderness, no distention, bowel sounds normal. No hepatosplenomegaly. No periumbilical or flank ecchymoses    MUSCULOSKELETAL: joints grossly normal    NEUROLOGICAL: appropriate mental status, grossly intact    PSYCHIATRIC: normal mood, affect and insight    SKIN: warm and dry, no rashes    EXT: no edema        LABORATORY    ELECTROLYTE PANEL   Recent Labs   Lab 08/02/21  1142 08/02/21  0904 08/02/21  0637 08/01/21  0940   NA  --   --  137 129*   CHLORIDE  --   --  102 97*   CO2  --   --  21* 14*   * 174* 156* 215*   CR  --   --  0.87  --    BUN  --   --  9 8      HEMATOLOGY PANEL   Recent Labs   Lab 08/01/21  0656   HGB 15.3   MCV 84   WBC 14.8*         LIVER AND PANCREAS PANEL   Recent Labs   Lab 08/02/21  0637 08/01/21  0940   ALT 26 34   ALKPHOS 67 75   BILITOTAL 0.8 0.8   LIPASE  --  76*     IMAGING STUDIES    EXAM: CT ABDOMEN PELVIS W   LOCATION: THE URGENCY ROOM [facility]   DATE/TIME: 7/31/2021 5:43 PM     INDICATION: Epigastric pain, history of pancreatitis with similar pain.   COMPARISON: None.   TECHNIQUE: CT scan of the abdomen and pelvis was performed following injection of IV contrast. Multiplanar reformats were obtained. Dose reduction techniques were used.   CONTRAST: IOPAMIDOL 300 MG/ML  ML BOTTLE: 100mL "     FINDINGS:   LOWER CHEST: Normal.     HEPATOBILIARY: Fatty infiltration of the liver.     PANCREAS: Enlargement of the pancreatic tail with surrounding inflammatory change compatible with acute interstitial pancreatitis.     SPLEEN: Normal.     ADRENAL GLANDS: Normal.     KIDNEYS/BLADDER: 1.5 mm nonobstructing stone upper pole left kidney.     BOWEL: Normal.     LYMPH NODES: Normal.     VASCULATURE: Unremarkable.     PELVIC ORGANS: Normal.     MUSCULOSKELETAL: Normal.     IMPRESSION:   1.  Changes of acute interstitial pancreatitis involving the pancreatic tail.     2.  Hepatomegaly with fatty infiltration of the liver.     3.  Tiny nonobstructing stone within the upper pole of the left kidney.      I have reviewed the current diagnostic and laboratory tests.                                                      Josias Ramos MD, [place], AGA    Thank you for the opportunity to participate in the care of this patient.   Please feel free to call me with any questions or concerns.  Phone number [phone].

## 2021-08-03 VITALS
BODY MASS INDEX: 37.24 KG/M2 | TEMPERATURE: 98.5 F | OXYGEN SATURATION: 97 % | SYSTOLIC BLOOD PRESSURE: 131 MMHG | HEIGHT: 68 IN | HEART RATE: 77 BPM | DIASTOLIC BLOOD PRESSURE: 71 MMHG | WEIGHT: 245.7 LBS | RESPIRATION RATE: 18 BRPM

## 2021-08-03 LAB
ANION GAP SERPL CALCULATED.3IONS-SCNC: 9 MMOL/L (ref 5–18)
BASOPHILS # BLD AUTO: 0.1 10E3/UL (ref 0–0.2)
BASOPHILS NFR BLD AUTO: 1 %
BUN SERPL-MCNC: 8 MG/DL (ref 8–22)
CALCIUM SERPL-MCNC: 8.2 MG/DL (ref 8.5–10.5)
CHLORIDE BLD-SCNC: 102 MMOL/L (ref 98–107)
CO2 SERPL-SCNC: 26 MMOL/L (ref 22–31)
CREAT SERPL-MCNC: 0.79 MG/DL (ref 0.7–1.3)
EOSINOPHIL # BLD AUTO: 0.3 10E3/UL (ref 0–0.7)
EOSINOPHIL NFR BLD AUTO: 3 %
ERYTHROCYTE [DISTWIDTH] IN BLOOD BY AUTOMATED COUNT: 13.4 % (ref 10–15)
GFR SERPL CREATININE-BSD FRML MDRD: >90 ML/MIN/1.73M2
GLUCOSE BLD-MCNC: 144 MG/DL (ref 70–125)
GLUCOSE BLDC GLUCOMTR-MCNC: 134 MG/DL (ref 70–125)
GLUCOSE BLDC GLUCOMTR-MCNC: 145 MG/DL (ref 70–125)
GLUCOSE BLDC GLUCOMTR-MCNC: 146 MG/DL (ref 70–125)
HCT VFR BLD AUTO: 40.2 % (ref 40–53)
HGB BLD-MCNC: 13.4 G/DL (ref 13.3–17.7)
HOLD SPECIMEN: NORMAL
IMM GRANULOCYTES # BLD: 0 10E3/UL
IMM GRANULOCYTES NFR BLD: 0 %
LYMPHOCYTES # BLD AUTO: 2.6 10E3/UL (ref 0.8–5.3)
LYMPHOCYTES NFR BLD AUTO: 27 %
MCH RBC QN AUTO: 28.5 PG (ref 26.5–33)
MCHC RBC AUTO-ENTMCNC: 33.3 G/DL (ref 31.5–36.5)
MCV RBC AUTO: 85 FL (ref 78–100)
MONOCYTES # BLD AUTO: 0.8 10E3/UL (ref 0–1.3)
MONOCYTES NFR BLD AUTO: 8 %
NEUTROPHILS # BLD AUTO: 6 10E3/UL (ref 1.6–8.3)
NEUTROPHILS NFR BLD AUTO: 61 %
NRBC # BLD AUTO: 0 10E3/UL
NRBC BLD AUTO-RTO: 0 /100
PLATELET # BLD AUTO: 241 10E3/UL (ref 150–450)
POTASSIUM BLD-SCNC: 3.5 MMOL/L (ref 3.5–5)
RBC # BLD AUTO: 4.71 10E6/UL (ref 4.4–5.9)
SODIUM SERPL-SCNC: 137 MMOL/L (ref 136–145)
WBC # BLD AUTO: 9.7 10E3/UL (ref 4–11)

## 2021-08-03 PROCEDURE — 99239 HOSP IP/OBS DSCHRG MGMT >30: CPT | Performed by: INTERNAL MEDICINE

## 2021-08-03 PROCEDURE — 80048 BASIC METABOLIC PNL TOTAL CA: CPT | Performed by: INTERNAL MEDICINE

## 2021-08-03 PROCEDURE — 36415 COLL VENOUS BLD VENIPUNCTURE: CPT | Performed by: INTERNAL MEDICINE

## 2021-08-03 PROCEDURE — 258N000003 HC RX IP 258 OP 636: Performed by: INTERNAL MEDICINE

## 2021-08-03 PROCEDURE — 250N000013 HC RX MED GY IP 250 OP 250 PS 637: Performed by: INTERNAL MEDICINE

## 2021-08-03 PROCEDURE — 250N000011 HC RX IP 250 OP 636: Performed by: INTERNAL MEDICINE

## 2021-08-03 PROCEDURE — 85025 COMPLETE CBC W/AUTO DIFF WBC: CPT | Performed by: INTERNAL MEDICINE

## 2021-08-03 PROCEDURE — C9113 INJ PANTOPRAZOLE SODIUM, VIA: HCPCS | Performed by: INTERNAL MEDICINE

## 2021-08-03 RX ORDER — LANCETS
1 EACH MISCELLANEOUS 2 TIMES DAILY
Qty: 100 EACH | Refills: 3 | Status: SHIPPED | OUTPATIENT
Start: 2021-08-03

## 2021-08-03 RX ORDER — LANCING DEVICE/LANCETS
KIT MISCELLANEOUS
Qty: 1 EACH | Refills: 3 | Status: SHIPPED | OUTPATIENT
Start: 2021-08-03

## 2021-08-03 RX ORDER — GLUCOSAMINE HCL/CHONDROITIN SU 500-400 MG
CAPSULE ORAL
Qty: 100 EACH | Refills: 6 | Status: SHIPPED | OUTPATIENT
Start: 2021-08-03

## 2021-08-03 RX ADMIN — ENOXAPARIN SODIUM 40 MG: 100 INJECTION SUBCUTANEOUS at 13:48

## 2021-08-03 RX ADMIN — SODIUM CHLORIDE: 9 INJECTION, SOLUTION INTRAVENOUS at 03:34

## 2021-08-03 RX ADMIN — ACETAMINOPHEN 650 MG: 325 TABLET ORAL at 02:16

## 2021-08-03 RX ADMIN — SODIUM CHLORIDE: 9 INJECTION, SOLUTION INTRAVENOUS at 08:53

## 2021-08-03 RX ADMIN — FENOFIBRATE 145 MG: 145 TABLET, COATED ORAL at 08:33

## 2021-08-03 RX ADMIN — PANTOPRAZOLE SODIUM 40 MG: 40 INJECTION, POWDER, FOR SOLUTION INTRAVENOUS at 08:32

## 2021-08-03 NOTE — PLAN OF CARE
Problem: Adult Inpatient Plan of Care  Goal: Readiness for Transition of Care  Outcome: Adequate for Discharge   After visit summary gone over with patient. Handouts provided. All questions answered. Note for employer provided. All belongings packed up. Directions to [place] pharmacy given.

## 2021-08-03 NOTE — PROGRESS NOTES
"  GASTROENTEROLOGY PROGRESS NOTE     SUBJECTIVE   Denies abdominal pain. Hopes to advance diet.   Denies tobacco use. Reports using alcohol sparingly. We discussed the importance of avoiding alcohol.   Agrees to f/up with pcp re: blood sugars and hypertriglyceridemia.      OBJECTIVE     Vitals Blood pressure 131/71, pulse 77, temperature 98.5  F (36.9  C), temperature source Oral, resp. rate 18, height 1.727 m (5' 8\"), weight 111.4 kg (245 lb 11.2 oz), SpO2 97 %.          Physical Exam   General: awake, alert, responds appropriately    Cardiovascular: S1S2, no edema    Chest: lungs are clear     Abdomen: +bs, soft, not tender          LABORATORY    ELECTROLYTE PANEL   Recent Labs   Lab 08/03/21  0617 08/03/21  0155 08/02/21  2040 08/02/21  0637 08/01/21  0940     --   --  137 129*   POTASSIUM 3.5  --   --   --   --    CHLORIDE 102  --   --  102 97*   CO2 26  --   --  21* 14*   * 146* 143* 156* 215*   CR 0.79  --   --  0.87  --    BUN 8  --   --  9 8      HEMATOLOGY PANEL   Recent Labs   Lab 08/03/21  0617 08/01/21  0656   HGB 13.4 15.3   MCV 85 84   WBC 9.7 14.8*    238      LIVER AND PANCREAS PANEL   Recent Labs   Lab 08/02/21  0637 08/01/21  0940   ALT 26 34   ALKPHOS 67 75   BILITOTAL 0.8 0.8   LIPASE  --  76*     Triglycerides 8/1/21 of 2405 vs 655 8/10/20, 997 6/15/20      IMAGING STUDIES    EXAM: US ABDOMEN LIMITED  LOCATION: [facility]  DATE/TIME: 8/1/2021 5:27 AM     INDICATION: evaluate gallbladder  COMPARISON: None.  TECHNIQUE: Limited abdominal ultrasound.     FINDINGS:     GALLBLADDER: Normal. No gallstones, wall thickening, or pericholecystic fluid. Negative sonographic Graf's sign.     BILE DUCTS: No biliary dilatation. The common duct measures 6 mm.     LIVER: Increased echogenicity from diffuse fatty infiltration with small areas of sparing in the gallbladder fossa. No focal mass.     RIGHT KIDNEY: No hydronephrosis.     PANCREAS: The pancreas is largely " obscured by overlying gas.     No ascites.                                                                      IMPRESSION:     1.  No cholelithiasis or biliary sludge. No ultrasound findings to suggest acute gallbladder inflammation.  2.  Fatty infiltration of the liver with sparing near the gallbladder fossa.    I have reviewed the current diagnostic and laboratory tests.              IMPRESSION   Pancreatitis- likely related to hypertriglyceridemia (>2K this admission). Continue Fenofibrate and blood glucose control. The patient is encouraged to follow up with pcp after discharge. Abd US negative for stones/slduge. He denies alcohol abuse, but encouraged cessation given situation.   The patient currently denies abdominal pain. No objection to advancing diet.   Fatty liver  Hyperglycemia with Hgb A1c 9.2 this admission  HTN             PLAN   No objection to advancing diet-- carb-controlled low fat diet  Blood glucose control and treat hypertriglyceridemia per primary team  The patient is encouraged to avoid alcohol completely  [facility] will sign off. Call with questions.         Georgie Rushing PA-C  Thank you for the opportunity to participate in the care of this patient.   Please feel free to call me with any questions or concerns.  Phone number [phone].

## 2021-08-03 NOTE — PLAN OF CARE
Problem: Infection (Pancreatitis)  Goal: Infection Symptom Resolution  Intervention: Prevent or Manage Infection  Recent Flowsheet Documentation  Taken 8/3/2021 0209 by Shanthi Wasserman, RN  Infection Management: (independent) --   IVF continuous.    Ambulated hallways independently.  Multiple loose stools on NOC.    Problem: Pain (Pancreatitis)  Goal: Acceptable Pain Control  Intervention: Monitor and Manage Pain  Recent Flowsheet Documentation  Taken 8/3/2021 0216 by Shanthi Wasserman, RN  Pain Management Interventions: medication (see MAR)   PRN tylenol given.  PRN oxycodone offered and declined.  Pain is described as bloating that decreases with flatus and urination.      Shanthi Wasserman, RN

## 2021-08-03 NOTE — CONSULTS
DIABETES CARE  Situation:  Consulted by Provider for Diabetes Education  40 year old male with type 2 Diabetes. Patient was admitted for Pancreatitis.      Background:  Related Co-morbidities include: HTN GABRIELLA, and hypertriglycerides.    PCP: Aga Perez MD    Lives with family in home.     Diabetes History:   Family history of type 2 Diabetes.  New diagnosis for Ever.  Also elevated triglycerides.      Meds for BG Management PTA:  No medications for diabetes as new diagnosis    Current Inpatient Meds for BG Management:  Metformin 500 mg twice daily  Novolog low resistance sliding scale    Labs:  Hemoglobin A1C: 9.2              Hgb: 13.4    SCr:.79    GFR: >90   Blood Glucose POC:     Results for EVER GRANT JR. (MRN [id number]) as of 8/3/2021 14:28   Ref. Range 8/2/2021 16:38 8/2/2021 20:40 8/3/2021 01:55 8/3/2021 08:29 8/3/2021 11:54   GLUCOSE BY METER POCT Latest Ref Range: 70 - 125 mg/dL 135 (H) 143 (H) 146 (H) 145 (H) 134 (H)         Diet Order: 60 grams CHO  Intake: Now 100% was 25%   Weight: 111.4kg    BMI: 37.36    DM EDUCATION/COUNSELING:  Barriers to Learning and/or DM Self-Management: None known   Previous DM Education: None  Current Education and/or visit with Patient and/or caregiver  Educated/reviewed diabetes basics: pathophysiology, hyperglycemia, long term complications, treatment.  Educated/reviewed hypoglycemia: sx, causes, treatment  Explained normal/goals of blood sugar control,A1C, when to call provider.  Educated/reviewed use of home glucose meter and patient able to demonstrate its use.  Educated on normal pancreas function, how oral medications work.Specific medications were explained, including how they each acted on blood sugar, their timing and differences in dosing.   .   Carbohydrates were briefly discussed and their effect on BG. Reinforced healthy eating.  RD to see here or in OP setting.    Written handouts given on all education provided.     Created goals with  "patient:  Morning reading before meal   2 hours after meals less than 180.     Assessment:  Newly diagnosed Type 2 Diabetes  Needed diabetes education and meter training.   Needs oral medication for home use.     Recommendations:  1. Start on metformin.    2. Diabetes - limit carbohydrates use plate method.   3. Given meter Accu chek guide me meter - will need supplies,   Refer to \"Guidelines for Insulin Initiation and Care in Hospitalized Adults\"  link in Diabetes Management Order set for dosing guidelines    Hospital BG goals for blood glucose levels are < 180 for improved health outcomes.    F/U Plans:  Follow up with primary and diabetes education.  A1c in 3 months would be great.     DISCHARGE NEEDS:   RX needed at [facility] (preferred by patient's insurance):  1. Accuchek Guide strips, 2 boxes of 50  2. SoftClix lancets, 1 box  Non-insulin requiring  Diagnosis code: E11.65  To test BG twice daily    Thank you,   Katina Yusuf RN, [facility]  Diabetes Care   VM: [phone]            "

## 2021-08-03 NOTE — DISCHARGE SUMMARY
[facility] MEDICINE  DISCHARGE SUMMARY     Primary Care Physician: Aga ePrez  Admission Date: 7/31/2021   Discharge Provider: Melia Waldron MD Discharge Date: 8/3/2021   Diet:   Active Diet and Nourishment Order   Procedures     Combination Diet Regular Diet Adult; Consistent Carb 60 Grams CHO per Meal Diet; Low Fat Diet     Diet       Code Status: Full Code   Activity: DCACTIVITY: Activity as tolerated        Condition at Discharge: Stable     REASON FOR PRESENTATION(See Admission Note for Details)   Abdominal pain     PRINCIPAL & ACTIVE DISCHARGE DIAGNOSES     Active Problems:    Pancreatitis    Hypertension    Hypertriglyceridemia    Esophageal reflux    Alcohol abuse    New onset type 2 diabetes mellitus (H)    Leukocytosis, unspecified type      PENDING LABS     Unresulted Labs Ordered in the Past 30 Days of this Admission     No orders found from 7/1/2021 to 8/1/2021.            PROCEDURES ( this hospitalization only)          RECOMMENDATIONS TO OUTPATIENT PROVIDER FOR F/U VISIT     Follow-up Appointments     Follow-up and recommended labs and tests       Follow up with primary care provider, Aga Perez, within 7 days to   evaluate medication change, to evaluate treatment change, and for hospital   follow- up.  The following labs/tests are recommended: Needs triglyceride   level check.                 DISPOSITION     Home    SUMMARY OF HOSPITAL COURSE:       acute pancreatitis transferred from [facility]      Pancreatitis: CT, abdomen pelvis w/: Changes of acute interstitial pancreatitis involving the pancreatic tail.  Hepatomegaly with fatty infiltration of the liver. Tiny nonobstructing stone within the upper pole of the left kidney.  - lipase at , elevated WBC 16.5  - passing flatus and pain is significantly decreased will try clears, discussed with patient if having recurrent abd pain then will make NPO again   - aggressive IVF  - h/o hypertriglyceridemia last  trigs 992 in march currently > 2,000  - alcohol use cessation discussed needs to refrain from all use  - GI consult appreciated  - continue fenofibrate already on maximum dose  - PCP f/u with lab check and triglyceride level. May need endocrinologist if still elevated VS GI     GABRIELLA: May use home CPAP     Hyperglycemia: No diagnosis of diabetes, blood sugars were in the 200s and he had glucose urea.   - HgA1c 9.2, new diagnosis of Diabetes, patient understands  - DNE seen  - metformin 500mg BID  - BG checks BID and s/u with PCP      hypertrig- last was 993 in march   - finally returned late last night > 2,000  - resume fenofibrate   - no ETOH   - recheck with PCP      GERD  - IV protonix for now until tolerating orals      History of hypertension:   - resume BP meds      Leukocytosis   - resolved   - no further fevers     Discharge Medications with Med changes:     Current Discharge Medication List      START taking these medications    Details   alcohol swab prep pads Use to swab area of injection/miguel angel as directed.  Qty: 100 each, Refills: 6    Associated Diagnoses: New onset type 2 diabetes mellitus (H)      blood glucose (ACCU-CHEK SOFTCLIX) lancing device Lancing device to be used with lancets.  Qty: 1 each, Refills: 3    Associated Diagnoses: New onset type 2 diabetes mellitus (H)      blood glucose monitoring (SOFTCLIX) lancets 1 each 2 times daily Use to test blood sugar two times daily.  Qty: 100 each, Refills: 3    Comments: Accuchek Guide strips, 2 boxes of 50  Associated Diagnoses: New onset type 2 diabetes mellitus (H)      metFORMIN (GLUCOPHAGE) 500 MG tablet Take 1 tablet (500 mg) by mouth 2 times daily (with meals)  Qty: 60 tablet, Refills: 0    Associated Diagnoses: New onset type 2 diabetes mellitus (H)         CONTINUE these medications which have NOT CHANGED    Details   chlorthalidone (HYGROTON) 25 MG tablet Take 25 mg by mouth daily      fenofibrate (TRICOR) 145 MG tablet Take 145 mg by mouth  daily before breakfast      fish oil-omega-3 fatty acids 1000 MG capsule Take 2 g by mouth 2 times daily      loratadine (CLARITIN) 10 MG tablet Take 10 mg by mouth daily      losartan (COZAAR) 50 MG tablet Take 50 mg by mouth daily      omeprazole (PRILOSEC) 20 MG DR capsule Take 20 mg by mouth daily before breakfast      rosuvastatin (CRESTOR) 20 MG tablet Take 20 mg by mouth daily                   Rationale for medication changes:              Consults       GASTROENTEROLOGY IP CONSULT  DIABETES EDUCATION IP CONSULT    Immunizations given this encounter     Most Recent Immunizations   Administered Date(s) Administered     COVID-19,PF,Pfizer 04/23/2021           Anticoagulation Information      Recent INR results: No results for input(s): INR in the last 168 hours.  Warfarin doses (if applicable) or name of other anticoagulant: none      SIGNIFICANT IMAGING FINDINGS     Results for orders placed or performed during the hospital encounter of 07/31/21   US Abdomen Limited    Impression    IMPRESSION:    1.  No cholelithiasis or biliary sludge. No ultrasound findings to suggest acute gallbladder inflammation.  2.  Fatty infiltration of the liver with sparing near the gallbladder fossa.           SIGNIFICANT LABORATORY FINDINGS     Most Recent 3 CBC's:Recent Labs   Lab Test 08/03/21  0617 08/01/21  0656   WBC 9.7 14.8*   HGB 13.4 15.3   MCV 85 84    238     Most Recent 3 BMP's:Recent Labs   Lab Test 08/03/21  1154 08/03/21  0829 08/03/21  0617 08/02/21  0637 08/01/21  0940   NA  --   --  137 137 129*   POTASSIUM  --   --  3.5  --   --    CHLORIDE  --   --  102 102 97*   CO2  --   --  26 21* 14*   BUN  --   --  8 9 8   CR  --   --  0.79 0.87  --    ANIONGAP  --   --  9 14 18   FACUNDO  --   --  8.2* 8.6 8.4*   * 145* 144* 156* 215*     Most Recent 2 LFT's:Recent Labs   Lab Test 08/02/21  0637 08/01/21  0940   ALT 26 34   ALKPHOS 67 75   BILITOTAL 0.8 0.8           Discharge Orders        Reason for your  hospital stay    Acute pancreatitis     Follow-up and recommended labs and tests     Follow up with primary care provider, Aga Perez, within 7 days to evaluate medication change, to evaluate treatment change, and for hospital follow- up.  The following labs/tests are recommended: Needs triglyceride level check.     Activity    Your activity upon discharge: activity as tolerated     Monitor and record    blood glucose twice daily     Diet    Follow this diet upon discharge: Orders Placed This Encounter      Combination Diet Regular Diet Adult; Consistent Carb 60 Grams CHO per Meal Diet; Low Fat Diet       Examination   Physical Exam   Temp:  [98.5  F (36.9  C)-100.1  F (37.8  C)] 98.5  F (36.9  C)  Pulse:  [77-84] 77  Resp:  [18] 18  BP: (131)/(63-71) 131/71  SpO2:  [97 %] 97 %  Wt Readings from Last 1 Encounters:   07/31/21 111.4 kg (245 lb 11.2 oz)       General Appearance: NAD comfortable   Respiratory: CTA b/l no W/R/R  Cardiovascular: RRR  GI: soft ND +BS NT  Skin: no erythema rashes or jaundice   Other: Alert and Ox3 no focal deficits      Please see EMR for more detailed significant labs, imaging, consultant notes etc.    IMelia MD, personally saw the patient today and spent greater than 30 minutes discharging this patient.    Melia Waldron MD  [facility]    CC:Aga Perez

## 2021-08-03 NOTE — PROGRESS NOTES
Care Management Follow Up    Length of Stay (days): 3    Expected Discharge Date: 08/04/2021     Concerns to be Addressed:     Medical mgmt, diet advancements    Patient plan of care discussed at interdisciplinary rounds: Yes    Anticipated Discharge Disposition:  Home      Anticipated Discharge Services:    Anticipated Discharge DME:          Additional Information:  Pt from home with SO. Plan to return home with family transport. CM following.      MAGGIE Jones

## 2021-08-03 NOTE — PLAN OF CARE
"  Problem: Adult Inpatient Plan of Care  Goal: Plan of Care Review  Outcome: Improving  Pt normally works overnight shift, states he only got a few hours of sleep over night \"here and there.\" states he sleeps better during the day. Pt allowed to rest in between cares during day shift.      Problem: Pain (Pancreatitis)  Goal: Acceptable Pain Control  Outcome: Improving   Pt denied pain, c/o bloating feeling. Noted bloating feeling to be relieved with passing gas and/or bowel movements. Pt encouraged to walk in hallways to help relieve bloating feeling.     Problem: Nutrition Impaired (Pancreatitis)  Goal: Optimal Nutrition Intake  Outcome: Improving  Pt's diet advanced to full liquids today, later advanced to regular diet. Pt tolerated intake well. Reported a bowel movement soon after eating, denied any pain, nausea or bloating associated with eating.   Pt educated on healthy intake after hospital discharge.   "

## 2021-08-04 DIAGNOSIS — Z71.89 OTHER SPECIFIED COUNSELING: ICD-10-CM

## 2021-10-17 ENCOUNTER — HEALTH MAINTENANCE LETTER (OUTPATIENT)
Age: 41
End: 2021-10-17

## 2021-12-12 ENCOUNTER — HEALTH MAINTENANCE LETTER (OUTPATIENT)
Age: 41
End: 2021-12-12

## 2022-03-16 LAB
ALBUMIN SERPL-MCNC: 4 G/DL (ref 3.5–5)
ALP SERPL-CCNC: 56 U/L (ref 45–120)
ALT SERPL W P-5'-P-CCNC: 79 U/L (ref 0–45)
ANION GAP SERPL CALCULATED.3IONS-SCNC: 12 MMOL/L (ref 5–18)
AST SERPL W P-5'-P-CCNC: 46 U/L (ref 0–40)
BASOPHILS # BLD AUTO: 0 10E3/UL (ref 0–0.2)
BASOPHILS NFR BLD AUTO: 0 %
BILIRUB DIRECT SERPL-MCNC: 0.2 MG/DL
BILIRUB SERPL-MCNC: 0.7 MG/DL (ref 0–1)
BUN SERPL-MCNC: 15 MG/DL (ref 8–22)
CALCIUM SERPL-MCNC: 8.6 MG/DL (ref 8.5–10.5)
CHLORIDE BLD-SCNC: 98 MMOL/L (ref 98–107)
CO2 SERPL-SCNC: 22 MMOL/L (ref 22–31)
CREAT SERPL-MCNC: 1.01 MG/DL (ref 0.7–1.3)
EOSINOPHIL # BLD AUTO: 0.2 10E3/UL (ref 0–0.7)
EOSINOPHIL NFR BLD AUTO: 1 %
ERYTHROCYTE [DISTWIDTH] IN BLOOD BY AUTOMATED COUNT: 13.3 % (ref 10–15)
GFR SERPL CREATININE-BSD FRML MDRD: >90 ML/MIN/1.73M2
GLUCOSE BLD-MCNC: 169 MG/DL (ref 70–125)
HCT VFR BLD AUTO: 52.6 % (ref 40–53)
HGB BLD-MCNC: 17.7 G/DL (ref 13.3–17.7)
HOLD SPECIMEN: NORMAL
HOLD SPECIMEN: NORMAL
IMM GRANULOCYTES # BLD: 0 10E3/UL
IMM GRANULOCYTES NFR BLD: 0 %
LIPASE SERPL-CCNC: 24 U/L (ref 0–52)
LYMPHOCYTES # BLD AUTO: 2.8 10E3/UL (ref 0.8–5.3)
LYMPHOCYTES NFR BLD AUTO: 23 %
MCH RBC QN AUTO: 28.7 PG (ref 26.5–33)
MCHC RBC AUTO-ENTMCNC: 33.7 G/DL (ref 31.5–36.5)
MCV RBC AUTO: 85 FL (ref 78–100)
MONOCYTES # BLD AUTO: 0.9 10E3/UL (ref 0–1.3)
MONOCYTES NFR BLD AUTO: 7 %
NEUTROPHILS # BLD AUTO: 8.1 10E3/UL (ref 1.6–8.3)
NEUTROPHILS NFR BLD AUTO: 69 %
NRBC # BLD AUTO: 0 10E3/UL
NRBC BLD AUTO-RTO: 0 /100
PLATELET # BLD AUTO: 366 10E3/UL (ref 150–450)
POTASSIUM BLD-SCNC: 3.3 MMOL/L (ref 3.5–5)
PROT SERPL-MCNC: 7.9 G/DL (ref 6–8)
RBC # BLD AUTO: 6.17 10E6/UL (ref 4.4–5.9)
SODIUM SERPL-SCNC: 132 MMOL/L (ref 136–145)
WBC # BLD AUTO: 11.9 10E3/UL (ref 4–11)

## 2022-03-16 PROCEDURE — 80053 COMPREHEN METABOLIC PANEL: CPT | Performed by: STUDENT IN AN ORGANIZED HEALTH CARE EDUCATION/TRAINING PROGRAM

## 2022-03-16 PROCEDURE — 80053 COMPREHEN METABOLIC PANEL: CPT | Performed by: EMERGENCY MEDICINE

## 2022-03-16 PROCEDURE — 99285 EMERGENCY DEPT VISIT HI MDM: CPT | Mod: 25

## 2022-03-16 PROCEDURE — 85025 COMPLETE CBC W/AUTO DIFF WBC: CPT | Performed by: EMERGENCY MEDICINE

## 2022-03-16 PROCEDURE — 83690 ASSAY OF LIPASE: CPT | Performed by: STUDENT IN AN ORGANIZED HEALTH CARE EDUCATION/TRAINING PROGRAM

## 2022-03-16 PROCEDURE — 83690 ASSAY OF LIPASE: CPT | Performed by: EMERGENCY MEDICINE

## 2022-03-16 PROCEDURE — 82248 BILIRUBIN DIRECT: CPT | Performed by: STUDENT IN AN ORGANIZED HEALTH CARE EDUCATION/TRAINING PROGRAM

## 2022-03-16 PROCEDURE — 36415 COLL VENOUS BLD VENIPUNCTURE: CPT | Performed by: STUDENT IN AN ORGANIZED HEALTH CARE EDUCATION/TRAINING PROGRAM

## 2022-03-16 PROCEDURE — 82248 BILIRUBIN DIRECT: CPT | Performed by: EMERGENCY MEDICINE

## 2022-03-16 PROCEDURE — 85025 COMPLETE CBC W/AUTO DIFF WBC: CPT | Performed by: STUDENT IN AN ORGANIZED HEALTH CARE EDUCATION/TRAINING PROGRAM

## 2022-03-17 ENCOUNTER — HOSPITAL ENCOUNTER (EMERGENCY)
Facility: HOSPITAL | Age: 42
Discharge: HOME | End: 2022-03-17
Attending: EMERGENCY MEDICINE | Admitting: EMERGENCY MEDICINE
Payer: COMMERCIAL

## 2022-03-17 ENCOUNTER — APPOINTMENT (OUTPATIENT)
Dept: CT IMAGING | Facility: HOSPITAL | Age: 42
End: 2022-03-17
Attending: EMERGENCY MEDICINE
Payer: COMMERCIAL

## 2022-03-17 VITALS
DIASTOLIC BLOOD PRESSURE: 73 MMHG | SYSTOLIC BLOOD PRESSURE: 121 MMHG | OXYGEN SATURATION: 94 % | WEIGHT: 250 LBS | TEMPERATURE: 98.6 F | RESPIRATION RATE: 18 BRPM | BODY MASS INDEX: 38.01 KG/M2 | HEART RATE: 89 BPM

## 2022-03-17 DIAGNOSIS — K52.9 GASTROENTERITIS: ICD-10-CM

## 2022-03-17 LAB
C COLI+JEJUNI+LARI FUSA STL QL NAA+PROBE: NOT DETECTED
C DIFF TOX B STL QL: NEGATIVE
EC STX1 GENE STL QL NAA+PROBE: NOT DETECTED
EC STX2 GENE STL QL NAA+PROBE: NOT DETECTED
LACTATE SERPL-SCNC: 2 MMOL/L (ref 0.7–2)
NOROV GI+II ORF1-ORF2 JNC STL QL NAA+PR: DETECTED
RVA NSP5 STL QL NAA+PROBE: NOT DETECTED
SALMONELLA SP RPOD STL QL NAA+PROBE: NOT DETECTED
SHIGELLA SP+EIEC IPAH STL QL NAA+PROBE: NOT DETECTED
V CHOL+PARA RFBL+TRKH+TNAA STL QL NAA+PR: NOT DETECTED
Y ENTERO RECN STL QL NAA+PROBE: NOT DETECTED

## 2022-03-17 PROCEDURE — 250N000013 HC RX MED GY IP 250 OP 250 PS 637: Performed by: EMERGENCY MEDICINE

## 2022-03-17 PROCEDURE — 250N000011 HC RX IP 250 OP 636: Performed by: EMERGENCY MEDICINE

## 2022-03-17 PROCEDURE — 96361 HYDRATE IV INFUSION ADD-ON: CPT

## 2022-03-17 PROCEDURE — 96365 THER/PROPH/DIAG IV INF INIT: CPT | Mod: 59

## 2022-03-17 PROCEDURE — 36415 COLL VENOUS BLD VENIPUNCTURE: CPT | Performed by: EMERGENCY MEDICINE

## 2022-03-17 PROCEDURE — 96375 TX/PRO/DX INJ NEW DRUG ADDON: CPT

## 2022-03-17 PROCEDURE — 258N000003 HC RX IP 258 OP 636: Performed by: EMERGENCY MEDICINE

## 2022-03-17 PROCEDURE — 83605 ASSAY OF LACTIC ACID: CPT | Performed by: EMERGENCY MEDICINE

## 2022-03-17 PROCEDURE — 74177 CT ABD & PELVIS W/CONTRAST: CPT

## 2022-03-17 PROCEDURE — 87493 C DIFF AMPLIFIED PROBE: CPT | Performed by: EMERGENCY MEDICINE

## 2022-03-17 PROCEDURE — 87040 BLOOD CULTURE FOR BACTERIA: CPT | Performed by: EMERGENCY MEDICINE

## 2022-03-17 PROCEDURE — 87506 IADNA-DNA/RNA PROBE TQ 6-11: CPT | Performed by: EMERGENCY MEDICINE

## 2022-03-17 RX ORDER — IOPAMIDOL 755 MG/ML
100 INJECTION, SOLUTION INTRAVASCULAR ONCE
Status: COMPLETED | OUTPATIENT
Start: 2022-03-17 | End: 2022-03-17

## 2022-03-17 RX ORDER — METRONIDAZOLE 500 MG/1
500 TABLET ORAL 2 TIMES DAILY
Qty: 20 TABLET | Refills: 0 | Status: SHIPPED | OUTPATIENT
Start: 2022-03-17 | End: 2022-03-27

## 2022-03-17 RX ORDER — MORPHINE SULFATE 4 MG/ML
4 INJECTION, SOLUTION INTRAMUSCULAR; INTRAVENOUS ONCE
Status: COMPLETED | OUTPATIENT
Start: 2022-03-17 | End: 2022-03-17

## 2022-03-17 RX ORDER — METRONIDAZOLE 500 MG/100ML
500 INJECTION, SOLUTION INTRAVENOUS ONCE
Status: COMPLETED | OUTPATIENT
Start: 2022-03-17 | End: 2022-03-17

## 2022-03-17 RX ORDER — ONDANSETRON 2 MG/ML
4 INJECTION INTRAMUSCULAR; INTRAVENOUS ONCE
Status: COMPLETED | OUTPATIENT
Start: 2022-03-17 | End: 2022-03-17

## 2022-03-17 RX ORDER — ONDANSETRON 8 MG/1
8 TABLET, ORALLY DISINTEGRATING ORAL EVERY 8 HOURS PRN
Qty: 12 TABLET | Refills: 0 | Status: SHIPPED | OUTPATIENT
Start: 2022-03-17

## 2022-03-17 RX ADMIN — METRONIDAZOLE 500 MG: 5 INJECTION, SOLUTION INTRAVENOUS at 04:27

## 2022-03-17 RX ADMIN — IOPAMIDOL 100 ML: 755 INJECTION, SOLUTION INTRAVENOUS at 04:45

## 2022-03-17 RX ADMIN — MORPHINE SULFATE 4 MG: 4 INJECTION, SOLUTION INTRAMUSCULAR; INTRAVENOUS at 03:48

## 2022-03-17 RX ADMIN — SODIUM CHLORIDE 1000 ML: 9 INJECTION, SOLUTION INTRAVENOUS at 04:05

## 2022-03-17 RX ADMIN — HYOSCYAMINE SULFATE 0.12 MG: 0.12 TABLET ORAL at 06:09

## 2022-03-17 RX ADMIN — ONDANSETRON 4 MG: 2 INJECTION INTRAMUSCULAR; INTRAVENOUS at 03:48

## 2022-03-17 ASSESSMENT — ENCOUNTER SYMPTOMS
VOMITING: 1
ABDOMINAL PAIN: 1
NAUSEA: 1
DIARRHEA: 1
BLOOD IN STOOL: 0

## 2022-03-17 NOTE — ED PROVIDER NOTES
EMERGENCY DEPARTMENT ENCOUNTER      NAME: Ever Guerrero Jr.  AGE: 41 year old male  YOB: 1980  MRN: [id number]  EVALUATION DATE & TIME: No admission date for patient encounter.    PCP: Aga Perez    ED PROVIDER: Maryann Ventura M.D.      Chief Complaint   Patient presents with     Abdominal Pain     Diarrhea         FINAL IMPRESSION:  1. Gastroenteritis        MEDICAL DECISION MAKING:  Pertinent Labs & Imaging studies reviewed. (See chart for details)  ED Course as of 03/17/22 0645   Thu Mar 17, 2022   0307 Oral temperature here is 99.8, pulse elevated at 114.  Otherwise vital signs unremarkable.  Patient is coming in with abdominal pain.  Started about a week ago, went to [facility], diagnosed at that time with likely infectious colitis.  Was discharged on azithromycin, felt better and now having pain again with large voluminous liquid stools that are foul-smelling.  Generalized abdominal pain.  He is having nausea and vomiting with this as well.Physical exam for patient here appears mildly uncomfortable, is tachycardic.  He has generalized abdominal tenderness to palpation but no focality and no rebound or guarding.I did review the chart from [facility], he was diagnosed with colitis there, certainly concern for a counter worsening process however given his reports of worsening diarrhea that is very foul-smelling I am also concerned for possible C. difficile.  Ordered C. difficile culture for patient here as well as stool culture.  Did recheck labs for patient here, he has mild elevation of his LFTs, otherwise labs consistent with diarrhea with slightly low sodium, potassium slightly decreased, he does have a leukocytosis.   0309 We will give him some fluids here, will start him on a dose of Flagyl.  Given his tachycardia, respiration rate of 20 will also get lactic acid and blood cultures.  Will reCT scan his abdomen.       CT scan of his belly here reveals likely evidence for gastroenteritis.   After medications given in the emergency department patient is significantly more comfortable.  He was able to leave a stool sample, was told that it could take a few days to get those results back.  Given this and my concern for possible C. difficile I did start him on metronidazole, discharge, told him to follow-up with his primary care doctor this coming Friday to get the results of the C. difficile as they may want him to stop the antibiotics.  Otherwise take medications for nausea and hyoscyamine for cramping as needed.  He feels comfortable with discharge to home at this time.    Critical care: 0 minutes excluding separately billable procedures.  Includes bedside management, time reviewing test results, review of records, discussing the case with staff, documenting the medical record and time spent with family members (or surrogate decision makers) discussing specific treatment issues.      ED Course:  2:48 AM I met with the patient, obtained history, performed an initial exam, and discussed options and plan for diagnostics and treatment here in the ED.   5:42 AM I went to update the patient on results. He is feeling better and was able to leave a stool sample.     The importance of close follow up was discussed. We reviewed warning signs and symptoms, and I instructed Mr. Guerrero to return to the emergency department immediately if he develops any new or worsening symptoms. I provided additional verbal discharge instructions. Mr. Guerrero expressed understanding and agreement with this plan of care, his questions were answered, and he was discharged in stable condition.     MEDICATIONS GIVEN IN THE EMERGENCY:  Medications   0.9% sodium chloride BOLUS (0 mLs Intravenous Stopped 3/17/22 0551)   ondansetron (ZOFRAN) injection 4 mg (4 mg Intravenous Given 3/17/22 0348)   morphine (PF) injection 4 mg (4 mg Intravenous Given 3/17/22 0348)   metroNIDAZOLE (FLAGYL) infusion 500 mg (0 mg Intravenous Stopped 3/17/22  0551)   0.9% sodium chloride BOLUS (0 mLs Intravenous Stopped 3/17/22 0609)   iopamidol (ISOVUE-370) solution 100 mL (100 mLs Intravenous Given 3/17/22 0445)   hyoscyamine (LEVSIN/SL) sublingual tablet 0.125 mg (0.125 mg Oral Given 3/17/22 0609)       NEW PRESCRIPTIONS STARTED AT TODAY'S ER VISIT:  Discharge Medication List as of 3/17/2022  6:15 AM      START taking these medications    Details   hyoscyamine SL (LEVSIN/SL) 0.125 MG sublingual tablet Take 1 tablet (0.125 mg) by mouth 3 times daily as needed (diarrhea), Disp-21 tablet, R-0, Local Print      metroNIDAZOLE (FLAGYL) 500 MG tablet Take 1 tablet (500 mg) by mouth 2 times daily for 10 days, Disp-20 tablet, R-0, Local PrintEat yogurt or cottage cheese daily to prevent diarrhea that can be caused by taking this medication.      ondansetron (ZOFRAN-ODT) 8 MG ODT tab Take 1 tablet (8 mg) by mouth every 8 hours as needed for nausea, Disp-12 tablet, R-0, Local Print                =================================================================    HPI    Patient information was obtained from: Patient    Use of : N/A         Ever Guerrero Jr. is a 41 year old male who presents with abdominal pain and diarrhea.     The patient reports nausea, vomiting, and worsening abdominal pain the last two days (one week total).  He [facility] being [facility] he had [facility] done for showing likely infectious colitis.  He finished a Z-pack yesterday.  He reports continuing foul smelling diarrhea, 2-3 episodes per day.  No blood in stool   No other complaints at this time       REVIEW OF SYSTEMS   Review of Systems   Gastrointestinal: Positive for abdominal pain, diarrhea (doul), nausea and vomiting. Negative for blood in stool.   All other systems reviewed and are negative.    PAST MEDICAL HISTORY:  DM II    PAST SURGICAL HISTORY:  No past surgical history on file.    CURRENT MEDICATIONS:    No current facility-administered medications for this  encounter.    Current Outpatient Medications:      hyoscyamine SL (LEVSIN/SL) 0.125 MG sublingual tablet, Take 1 tablet (0.125 mg) by mouth 3 times daily as needed (diarrhea), Disp: 21 tablet, Rfl: 0     metroNIDAZOLE (FLAGYL) 500 MG tablet, Take 1 tablet (500 mg) by mouth 2 times daily for 10 days, Disp: 20 tablet, Rfl: 0     ondansetron (ZOFRAN-ODT) 8 MG ODT tab, Take 1 tablet (8 mg) by mouth every 8 hours as needed for nausea, Disp: 12 tablet, Rfl: 0     alcohol swab prep pads, Use to swab area of injection/miguel angel as directed., Disp: 100 each, Rfl: 6     blood glucose (ACCU-CHEK SOFTCLIX) lancing device, Lancing device to be used with lancets., Disp: 1 each, Rfl: 3     blood glucose monitoring (SOFTCLIX) lancets, 1 each 2 times daily Use to test blood sugar two times daily., Disp: 100 each, Rfl: 3     chlorthalidone (HYGROTON) 25 MG tablet, Take 25 mg by mouth daily, Disp: , Rfl:      fenofibrate (TRICOR) 145 MG tablet, Take 145 mg by mouth daily before breakfast, Disp: , Rfl:      fish oil-omega-3 fatty acids 1000 MG capsule, Take 2 g by mouth 2 times daily, Disp: , Rfl:      loratadine (CLARITIN) 10 MG tablet, Take 10 mg by mouth daily, Disp: , Rfl:      losartan (COZAAR) 50 MG tablet, Take 50 mg by mouth daily, Disp: , Rfl:      metFORMIN (GLUCOPHAGE) 500 MG tablet, Take 1 tablet (500 mg) by mouth 2 times daily (with meals), Disp: 60 tablet, Rfl: 0     rosuvastatin (CRESTOR) 20 MG tablet, Take 20 mg by mouth daily, Disp: , Rfl:     ALLERGIES:  Allergies   Allergen Reactions     Amoxicillin Diarrhea       FAMILY HISTORY:  No family history on file.    SOCIAL HISTORY:   Social History     Socioeconomic History     Marital status: Single     Spouse name: Not on file     Number of children: Not on file     Years of education: Not on file     Highest education level: Not on file   Occupational History     Not on file   Tobacco Use     Smoking status: Former Smoker     Types: Cigarettes     Smokeless tobacco: Never  Used     Tobacco comment: per review of care everywhere 8/2/21   Substance and Sexual Activity     Alcohol use: Not on file     Drug use: Not on file     Sexual activity: Not on file   Other Topics Concern     Not on file   Social History Narrative     Not on file     Social Determinants of Health     Financial Resource Strain: Not on file   Food Insecurity: Not on file   Transportation Needs: Not on file   Physical Activity: Not on file   Stress: Not on file   Social Connections: Not on file   Intimate Partner Violence: Not on file   Housing Stability: Not on file     PHYSICAL EXAM:    Vitals: /73 (BP Location: Left arm)   Pulse 89   Temp 98.6  F (37  C) (Oral)   Resp 18   Wt 113.4 kg (250 lb)   SpO2 94%   BMI 38.01 kg/m     General:. Alert and interactive, comfortable appearing.  HENT: Oropharynx without erythema or exudates. MMM.  TMs clear bilaterally.  Eyes: Pupils mid-sized and equally reactive.   Neck: Full AROM.  No midline tenderness to palpation.  Cardiovascular: Tachycardic, Regular rhythm. Peripheral pulses 2+ bilaterally.  Chest/Pulmonary: Normal work of breathing. Lung sounds clear and equal throughout, no wheezes or crackles. No chest wall tenderness or deformities.  Abdomen: Soft, nondistended. generalized abdominal pain without focality and without guarding or rebound.  Back/Spine: No CVA or midline tenderness.  Extremities: Normal ROM of all major joints. No lower extremity edema.   Skin: Warm and dry. Normal skin color.   Neuro: Speech clear. CNs grossly intact. Moves all extremities appropriately. Strength and sensation grossly intact to all extremities.   Psych: Normal affect/mood, cooperative, memory appropriate.     LAB:  All pertinent labs reviewed and interpreted.  Labs Ordered and Resulted from Time of ED Arrival to Time of ED Departure   BASIC METABOLIC PANEL - Abnormal       Result Value    Sodium 132 (*)     Potassium 3.3 (*)     Chloride 98      Carbon Dioxide (CO2) 22       Anion Gap 12      Urea Nitrogen 15      Creatinine 1.01      Calcium 8.6      Glucose 169 (*)     GFR Estimate >90     HEPATIC FUNCTION PANEL - Abnormal    Bilirubin Total 0.7      Bilirubin Direct 0.2      Protein Total 7.9      Albumin 4.0      Alkaline Phosphatase 56      AST 46 (*)     ALT 79 (*)    CBC WITH PLATELETS AND DIFFERENTIAL - Abnormal    WBC Count 11.9 (*)     RBC Count 6.17 (*)     Hemoglobin 17.7      Hematocrit 52.6      MCV 85      MCH 28.7      MCHC 33.7      RDW 13.3      Platelet Count 366      % Neutrophils 69      % Lymphocytes 23      % Monocytes 7      % Eosinophils 1      % Basophils 0      % Immature Granulocytes 0      NRBCs per 100 WBC 0      Absolute Neutrophils 8.1      Absolute Lymphocytes 2.8      Absolute Monocytes 0.9      Absolute Eosinophils 0.2      Absolute Basophils 0.0      Absolute Immature Granulocytes 0.0      Absolute NRBCs 0.0     LIPASE - Normal    Lipase 24     LACTIC ACID WHOLE BLOOD - Normal    Lactic Acid 2.0     CLOSTRIDIUM DIFFICILE TOXIN B   ENTERIC BACTERIA AND VIRUS PANEL BY KARRIE STOOL   BLOOD CULTURE   BLOOD CULTURE       RADIOLOGY:  CT Abdomen Pelvis w Contrast   Final Result   IMPRESSION:    1.  Fluid content throughout the large and small bowel, nonspecific though can be seen in gastroenteritis.      2.  Hepatomegaly and diffuse hepatic steatosis.          EKG:  See MDM    PROCEDURES:   None    I, Oh Carson, am serving as a scribe to document services personally performed by Dr. Maryann Ventura  based on my observation and the provider's statements to me. I, Maryann Ventura MD attest that Oh Carson is acting in a scribe capacity, has observed my performance of the services and has documented them in accordance with my direction.      Maryann Ventura M.D.  Emergency Medicine  [facility] EMERGENCY DEPARTMENT  [facility] [facility] [id number] [phone]  Dept: [phone]      Nerissa Ventura,  MD  03/17/22 0645

## 2022-03-17 NOTE — Clinical Note
Ever Guerrero was seen and treated in our emergency department on 3/16/2022.  He may return to work on 03/28/2022.  Seen in the emergency department.     If you have any questions or concerns, please don't hesitate to call.      Nerissa Ventura MD

## 2022-03-17 NOTE — ED TRIAGE NOTES
Two weeks ago, pt developed abdominal pain, nausea, and diarrhea. Pt was prescribed a Z-pack for an abdominal abscess five days ago and the symptoms went away. His symptoms started again yesterday, which was his last day of antibiotics.

## 2022-03-17 NOTE — DISCHARGE INSTRUCTIONS
Given the fact that you were recently on antibiotics and had this gastroenteritis that got worse.  You should take the antibiotics, you should call your primary care doctor on Friday to get the results of the stool culture.  They may want you to stop taking the antibiotics if your C. difficile test came back negative.  Otherwise continue with the hyoscyamine for diarrhea and abdominal cramping and Zofran as needed for nausea.  Return for any new or worsening symptoms.

## 2022-03-22 LAB
BACTERIA BLD CULT: NO GROWTH
BACTERIA BLD CULT: NO GROWTH

## 2022-04-03 ENCOUNTER — HEALTH MAINTENANCE LETTER (OUTPATIENT)
Age: 42
End: 2022-04-03

## 2022-07-24 ENCOUNTER — HEALTH MAINTENANCE LETTER (OUTPATIENT)
Age: 42
End: 2022-07-24

## 2022-10-03 ENCOUNTER — HEALTH MAINTENANCE LETTER (OUTPATIENT)
Age: 42
End: 2022-10-03

## 2023-02-11 ENCOUNTER — HEALTH MAINTENANCE LETTER (OUTPATIENT)
Age: 43
End: 2023-02-11

## 2023-05-20 ENCOUNTER — HEALTH MAINTENANCE LETTER (OUTPATIENT)
Age: 43
End: 2023-05-20

## 2023-10-21 ENCOUNTER — HEALTH MAINTENANCE LETTER (OUTPATIENT)
Age: 43
End: 2023-10-21

## 2023-12-29 ENCOUNTER — HOSPITAL ENCOUNTER (EMERGENCY)
Facility: HOSPITAL | Age: 43
Discharge: HOME | End: 2023-12-29
Attending: EMERGENCY MEDICINE | Admitting: EMERGENCY MEDICINE
Payer: COMMERCIAL

## 2023-12-29 ENCOUNTER — APPOINTMENT (OUTPATIENT)
Dept: CT IMAGING | Facility: HOSPITAL | Age: 43
End: 2023-12-29
Attending: EMERGENCY MEDICINE
Payer: COMMERCIAL

## 2023-12-29 VITALS
DIASTOLIC BLOOD PRESSURE: 73 MMHG | BODY MASS INDEX: 38.01 KG/M2 | TEMPERATURE: 98.3 F | OXYGEN SATURATION: 97 % | RESPIRATION RATE: 20 BRPM | SYSTOLIC BLOOD PRESSURE: 132 MMHG | WEIGHT: 250 LBS | HEART RATE: 78 BPM

## 2023-12-29 DIAGNOSIS — R10.13 EPIGASTRIC PAIN: ICD-10-CM

## 2023-12-29 LAB
ALBUMIN SERPL BCG-MCNC: 4.6 G/DL (ref 3.5–5.2)
ALP SERPL-CCNC: 53 U/L (ref 40–150)
ALT SERPL W P-5'-P-CCNC: 64 U/L (ref 0–70)
ANION GAP SERPL CALCULATED.3IONS-SCNC: 12 MMOL/L (ref 7–15)
AST SERPL W P-5'-P-CCNC: 58 U/L (ref 0–45)
BILIRUB SERPL-MCNC: 0.4 MG/DL
BUN SERPL-MCNC: 16.1 MG/DL (ref 6–20)
CALCIUM SERPL-MCNC: 9.3 MG/DL (ref 8.6–10)
CHLORIDE SERPL-SCNC: 99 MMOL/L (ref 98–107)
CREAT SERPL-MCNC: 0.87 MG/DL (ref 0.67–1.17)
DEPRECATED HCO3 PLAS-SCNC: 25 MMOL/L (ref 22–29)
EGFRCR SERPLBLD CKD-EPI 2021: >90 ML/MIN/1.73M2
ERYTHROCYTE [DISTWIDTH] IN BLOOD BY AUTOMATED COUNT: 13.2 % (ref 10–15)
GLUCOSE SERPL-MCNC: 160 MG/DL (ref 70–99)
HCT VFR BLD AUTO: 39.9 % (ref 40–53)
HGB BLD-MCNC: 14.1 G/DL (ref 13.3–17.7)
LACTATE SERPL-SCNC: 2.6 MMOL/L (ref 0.7–2)
LIPASE SERPL-CCNC: 43 U/L (ref 13–60)
MCH RBC QN AUTO: 29 PG (ref 26.5–33)
MCHC RBC AUTO-ENTMCNC: 35.3 G/DL (ref 31.5–36.5)
MCV RBC AUTO: 82 FL (ref 78–100)
PLATELET # BLD AUTO: 344 10E3/UL (ref 150–450)
POTASSIUM SERPL-SCNC: 3.7 MMOL/L (ref 3.4–5.3)
PROT SERPL-MCNC: 7.5 G/DL (ref 6.4–8.3)
RBC # BLD AUTO: 4.86 10E6/UL (ref 4.4–5.9)
SODIUM SERPL-SCNC: 136 MMOL/L (ref 135–145)
WBC # BLD AUTO: 9.9 10E3/UL (ref 4–11)

## 2023-12-29 PROCEDURE — 83690 ASSAY OF LIPASE: CPT | Performed by: EMERGENCY MEDICINE

## 2023-12-29 PROCEDURE — 74177 CT ABD & PELVIS W/CONTRAST: CPT

## 2023-12-29 PROCEDURE — 36415 COLL VENOUS BLD VENIPUNCTURE: CPT | Performed by: EMERGENCY MEDICINE

## 2023-12-29 PROCEDURE — 96375 TX/PRO/DX INJ NEW DRUG ADDON: CPT

## 2023-12-29 PROCEDURE — 250N000011 HC RX IP 250 OP 636: Performed by: EMERGENCY MEDICINE

## 2023-12-29 PROCEDURE — 96361 HYDRATE IV INFUSION ADD-ON: CPT

## 2023-12-29 PROCEDURE — 99285 EMERGENCY DEPT VISIT HI MDM: CPT | Mod: 25

## 2023-12-29 PROCEDURE — 85027 COMPLETE CBC AUTOMATED: CPT | Performed by: EMERGENCY MEDICINE

## 2023-12-29 PROCEDURE — 80053 COMPREHEN METABOLIC PANEL: CPT | Performed by: EMERGENCY MEDICINE

## 2023-12-29 PROCEDURE — 258N000003 HC RX IP 258 OP 636: Performed by: EMERGENCY MEDICINE

## 2023-12-29 PROCEDURE — 83605 ASSAY OF LACTIC ACID: CPT | Performed by: EMERGENCY MEDICINE

## 2023-12-29 PROCEDURE — 96374 THER/PROPH/DIAG INJ IV PUSH: CPT | Mod: 59

## 2023-12-29 RX ORDER — ONDANSETRON 2 MG/ML
4 INJECTION INTRAMUSCULAR; INTRAVENOUS ONCE
Status: COMPLETED | OUTPATIENT
Start: 2023-12-29 | End: 2023-12-29

## 2023-12-29 RX ORDER — IOPAMIDOL 755 MG/ML
80 INJECTION, SOLUTION INTRAVASCULAR ONCE
Status: COMPLETED | OUTPATIENT
Start: 2023-12-29 | End: 2023-12-29

## 2023-12-29 RX ORDER — FAMOTIDINE 20 MG/1
20 TABLET, FILM COATED ORAL 2 TIMES DAILY
Qty: 30 TABLET | Refills: 0 | Status: SHIPPED | OUTPATIENT
Start: 2023-12-29

## 2023-12-29 RX ORDER — ONDANSETRON 4 MG/1
4 TABLET, ORALLY DISINTEGRATING ORAL EVERY 8 HOURS PRN
Qty: 10 TABLET | Refills: 0 | Status: SHIPPED | OUTPATIENT
Start: 2023-12-29 | End: 2024-01-01

## 2023-12-29 RX ADMIN — ONDANSETRON 4 MG: 2 INJECTION INTRAMUSCULAR; INTRAVENOUS at 03:08

## 2023-12-29 RX ADMIN — IOPAMIDOL 80 ML: 755 INJECTION, SOLUTION INTRAVENOUS at 03:54

## 2023-12-29 RX ADMIN — FAMOTIDINE 20 MG: 10 INJECTION, SOLUTION INTRAVENOUS at 03:08

## 2023-12-29 RX ADMIN — SODIUM CHLORIDE 1000 ML: 9 INJECTION, SOLUTION INTRAVENOUS at 03:07

## 2023-12-29 ASSESSMENT — ACTIVITIES OF DAILY LIVING (ADL): ADLS_ACUITY_SCORE: 35

## 2023-12-29 NOTE — ED PROVIDER NOTES
EMERGENCY DEPARTMENT ENCOUNTER      NAME: Ever Guerrero Jr.  AGE: 43 year old male  YOB: 1980  MRN: [id number]  EVALUATION DATE & TIME: No admission date for patient encounter.    PCP: Aga Perez    ED PROVIDER: Jason Irving M.D.      Chief Complaint   Patient presents with    Abdominal Pain         FINAL IMPRESSION:  Abdominal pain      ED COURSE & MEDICAL DECISION MAKING:    Pertinent Labs & Imaging studies reviewed. (See chart for details)  43 year old male presents to the Emergency Department for evaluation of severe abdominal pain.  Describes an josue achiness.  Localizes the central abdominal area.  Reports feeling like he is full of gas.  Patient did use MiraLAX as he thought he might have been backed up but this did not provide any obvious relief.  States this feels differently with his previous episodes of pancreatitis.  No prior abdominal surgeries.  On exam he is obese male in mild distress.  No suprapubic/right lower quadrant/left lower quadrant tenderness.  Mild periumbilical tenderness.  Marked epigastric tenderness.  Findings more consistent with severe GERD/dyspepsia versus recurrent pancreatitis.  Less likely to represent diverticular disease given location.  Possibility of hepatic steatosis.  Laboratory evaluation sent.  Patient treated symptomatically with intravenous Zofran and Pepcid.  Normal saline fluid bolus also given.  Patient appears non toxic with stable vitals signs.     2:52 AM  I met with the patient for the initial interview and physical examination. Discussed plan for treatment and workup in the ED.    3:39 AM I updated the patient on their results and plan and re-evaluated them.  Laboratory evaluation essentially unremarkable other than mildly elevated glucose at 160 and AST at 58.  Lactic acid minimally elevated 2.6.  Lipase normal at 43.  Patient reports no significant improvement with Pepcid raising concerns of non-gastric etiology of symptoms.  Will proceed  with CT imaging of the abdomen  4:36 AM.  CT imaging without acute findings to explain patient's symptoms.  Patient with hepatic steatosis.  This is previously documented.  Nonobstructing nephrolithiasis noted.  No evidence of diverticulitis, pancreatitis.  Patient reassured in regards to findings.  Will discharge with Zofran and Pepcid for symptomatic relief.  Routine return precautions given  At the conclusion of the encounter I discussed the results of all of the tests and the disposition. The questions were answered and return precautions provided. The patient or family acknowledged understanding and was agreeable with the care plan.     Medical Decision Making    History:  Supplemental history from: Documented in chart, if applicable  External Record(s) reviewed: Documented in chart, if applicable.    Work Up:  Chart documentation includes differential considered and any EKGs or imaging independently interpreted by provider, where specified.  In additional to work up documented, I considered the following work up: Documented in chart, if applicable.    External consultation:  Discussion of management with another provider: Documented in chart, if applicable    Complicating factors:  Care impacted by chronic illness: Diabetes, Hypertension, and Other: pancreatitis  Care affected by social determinants of health: Alcohol Abuse and/or Recreational Drug Use    Disposition considerations: Discharge. I prescribed additional prescription strength medication(s) as charted. See documentation for any additional details.     PPE: Provider wore  paper mask.     MEDICATIONS GIVEN IN THE EMERGENCY:  Medications   sodium chloride 0.9% BOLUS 1,000 mL (has no administration in time range)       NEW PRESCRIPTIONS STARTED AT TODAY'S ER VISIT  Current Discharge Medication List        START taking these medications    Details   famotidine (PEPCID) 20 MG tablet Take 1 tablet (20 mg) by mouth 2 times daily  Qty: 30 tablet, Refills: 0       !! ondansetron (ZOFRAN ODT) 4 MG ODT tab Take 1 tablet (4 mg) by mouth every 8 hours as needed  Qty: 10 tablet, Refills: 0       !! - Potential duplicate medications found. Please discuss with provider.              =================================================================    HPI    Patient information was obtained from: patient     Use of Intrepreter: N/A       ThierryPHI Guerrero Jr. is a 43 year old male with a pertient medical history of hypertension, pancreatitis, diabetes mellitus, alcohol abuse,  who presents to the ED for evaluation of abdominal pain.     Patient reports cramping upper abdominal pain since Tuesday (12/26) similar to gas pain. He notes 12/26 he vomited all day then felt constipated on 12/27 so he took miralax on 12/28 and had loose stools. He felt fine, but woke up ~12 AM with severe pain. Patient finds minimal relief when going to the bathroom, but it returns shortly after. Endorses history of pancreatitis, but notes this feels different. Denies history of abdominal surgery. Denies new diet.     Patient's kids have COVID right now. He had it last week, but tested negative on 12/22.       REVIEW OF SYSTEMS   Constitutional:  Denies fever, chills  Respiratory:  Denies productive cough or increased work of breathing  Cardiovascular:  Denies chest pain, palpitations  GI:  Endorses epigastric abdominal pain, Denies nausea, vomiting, or change in bowel or bladder habits   Musculoskeletal:  Denies any new muscle/joint swelling  Skin:  Denies rash   Neurologic:  Denies focal weakness  All systems negative except as marked.     PAST MEDICAL HISTORY:  History reviewed. No pertinent past medical history.    PAST SURGICAL HISTORY:  History reviewed. No pertinent surgical history.      CURRENT MEDICATIONS:      Current Facility-Administered Medications:     sodium chloride 0.9% BOLUS 1,000 mL, 1,000 mL, Intravenous, Once, Jason Irving MD    Current Outpatient Medications:     alcohol swab  prep pads, Use to swab area of injection/miguel angel as directed., Disp: 100 each, Rfl: 6    blood glucose (ACCU-CHEK SOFTCLIX) lancing device, Lancing device to be used with lancets., Disp: 1 each, Rfl: 3    blood glucose monitoring (SOFTCLIX) lancets, 1 each 2 times daily Use to test blood sugar two times daily., Disp: 100 each, Rfl: 3    chlorthalidone (HYGROTON) 25 MG tablet, Take 25 mg by mouth daily, Disp: , Rfl:     fenofibrate (TRICOR) 145 MG tablet, Take 145 mg by mouth daily before breakfast, Disp: , Rfl:     fish oil-omega-3 fatty acids 1000 MG capsule, Take 2 g by mouth 2 times daily, Disp: , Rfl:     hyoscyamine SL (LEVSIN/SL) 0.125 MG sublingual tablet, Take 1 tablet (0.125 mg) by mouth 3 times daily as needed (diarrhea), Disp: 21 tablet, Rfl: 0    loratadine (CLARITIN) 10 MG tablet, Take 10 mg by mouth daily, Disp: , Rfl:     losartan (COZAAR) 50 MG tablet, Take 50 mg by mouth daily, Disp: , Rfl:     metFORMIN (GLUCOPHAGE) 500 MG tablet, Take 1 tablet (500 mg) by mouth 2 times daily (with meals), Disp: 60 tablet, Rfl: 0    ondansetron (ZOFRAN-ODT) 8 MG ODT tab, Take 1 tablet (8 mg) by mouth every 8 hours as needed for nausea, Disp: 12 tablet, Rfl: 0    rosuvastatin (CRESTOR) 20 MG tablet, Take 20 mg by mouth daily, Disp: , Rfl:     ALLERGIES:  Allergies   Allergen Reactions    Amoxicillin Diarrhea       FAMILY HISTORY:  History reviewed. No pertinent family history.    SOCIAL HISTORY:   Social History     Socioeconomic History    Marital status: Single     Spouse name: None    Number of children: None    Years of education: None    Highest education level: None   Tobacco Use    Smoking status: Former     Types: Cigarettes    Smokeless tobacco: Never    Tobacco comments:     per review of care everywhere 8/2/21       VITALS:  Patient Vitals for the past 24 hrs:   BP Temp Temp src Pulse Resp SpO2 Weight   12/29/23 0247 (!) 141/78 98.3  F (36.8  C) Tympanic 89 20 97 % 113.4 kg (250 lb)        PHYSICAL EXAM     Constitutional:  Awake, alert, in mild apparent distress, obese male  HENT:  Normocephalic, Atraumatic. Bilateral external ears normal. Oropharynx moist. Nose normal. Neck- Normal range of motion with no guarding, No midline cervical tenderness, Supple, No stridor.   Eyes:  PERRL, EOMI with no signs of entrapment, Conjunctiva normal, No discharge.   Respiratory:  Normal breath sounds, No respiratory distress, No wheezing.    Cardiovascular:  Normal heart rate, Normal rhythm, No appreciable rubs or gallops.   GI:  Soft, No distension, No palpable masses moderate epigastric tenderness, no lower abdominal tenderness  Musculoskeletal:   No edema. Good range of motion in all major joints. No tenderness to palpation or major deformities noted.  Integument:  Warm, Dry, No erythema, No rash.   Neurologic:  Alert & oriented, Normal motor function, Normal sensory function, No focal deficits noted.   Psychiatric:  Affect normal, Judgment normal, Mood normal.     LAB:  All pertinent labs reviewed and interpreted.     Results for orders placed or performed during the hospital encounter of 12/29/23   CT Abdomen Pelvis w Contrast     Status: None    Narrative    EXAM: CT ABDOMEN PELVIS W CONTRAST  LOCATION: [facility]  DATE: 12/29/2023    INDICATION: Central abdominal tenderness  COMPARISON: 03/17/2022  TECHNIQUE: CT scan of the abdomen and pelvis was performed following injection of IV contrast. Multiplanar reformats were obtained. Dose reduction techniques were used.  CONTRAST: Isovue 370 80 ML    FINDINGS:   LOWER CHEST: Normal.    HEPATOBILIARY: Hepatomegaly and diffuse steatosis similar to prior. No liver mass. No biliary dilatation. No calcified gallstones.    PANCREAS: Normal.    SPLEEN: Normal.    ADRENAL GLANDS: Normal.    KIDNEYS/BLADDER: Nonobstructing 2 mm calculus left upper pole. No hydronephrosis.     BOWEL: No obstruction or inflammatory change. Normal appendix.    LYMPH NODES:  Normal.    VASCULATURE: Unremarkable.    PELVIC ORGANS: Normal.    MUSCULOSKELETAL: Small left fat-containing inguinal hernia. Bones are unremarkable.       Impression    IMPRESSION:   1.  No acute process in the abdomen or pelvis.  2.  Hepatomegaly and diffuse hepatic steatosis.  3.  Nonobstructing left nephrolithiasis.   Comprehensive metabolic panel     Status: Abnormal   Result Value Ref Range    Sodium 136 135 - 145 mmol/L    Potassium 3.7 3.4 - 5.3 mmol/L    Carbon Dioxide (CO2) 25 22 - 29 mmol/L    Anion Gap 12 7 - 15 mmol/L    Urea Nitrogen 16.1 6.0 - 20.0 mg/dL    Creatinine 0.87 0.67 - 1.17 mg/dL    GFR Estimate >90 >60 mL/min/1.73m2    Calcium 9.3 8.6 - 10.0 mg/dL    Chloride 99 98 - 107 mmol/L    Glucose 160 (H) 70 - 99 mg/dL    Alkaline Phosphatase 53 40 - 150 U/L    AST 58 (H) 0 - 45 U/L    ALT 64 0 - 70 U/L    Protein Total 7.5 6.4 - 8.3 g/dL    Albumin 4.6 3.5 - 5.2 g/dL    Bilirubin Total 0.4 <=1.2 mg/dL   Lipase     Status: Normal   Result Value Ref Range    Lipase 43 13 - 60 U/L   Lactic acid whole blood     Status: Abnormal   Result Value Ref Range    Lactic Acid 2.6 (H) 0.7 - 2.0 mmol/L   CBC (+ platelets, no diff)     Status: Abnormal   Result Value Ref Range    WBC Count 9.9 4.0 - 11.0 10e3/uL    RBC Count 4.86 4.40 - 5.90 10e6/uL    Hemoglobin 14.1 13.3 - 17.7 g/dL    Hematocrit 39.9 (L) 40.0 - 53.0 %    MCV 82 78 - 100 fL    MCH 29.0 26.5 - 33.0 pg    MCHC 35.3 31.5 - 36.5 g/dL    RDW 13.2 10.0 - 15.0 %    Platelet Count 344 150 - 450 10e3/uL      RADIOLOGY:  Reviewed all pertinent imaging. Please see official radiology report.  No orders to display         PROCEDURES:   N/A        Vivian SIMON, am serving as a scribe to document services personally performed by Jason Irving MD, based on my observation and the provider's statements to me. IJason MD attest that Vivian Nolasco is acting in a scribe capacity, has observed my performance of the services and has documented them in  accordance with my direction.    Jason Irving M.D.  Emergency Medicine  [facility] EMERGENCY DEPARTMENT     Jason Irving MD  12/29/23 0438       Jason Irving MD  12/29/23 0452

## 2023-12-29 NOTE — ED TRIAGE NOTES
Pt c/o abdominal pain that started Tuesday. Pt had vomited Tuesday. Pt took Miralax thinking that he was constipated. Had loose BM x 4. C/o N/V. Denies history of kidney stones, no blood in his urine.     Triage Assessment (Adult)       Row Name 12/29/23 0249          Triage Assessment    Airway WDL WDL        Respiratory WDL    Respiratory WDL WDL        Skin Circulation/Temperature WDL    Skin Circulation/Temperature WDL WDL        Cardiac WDL    Cardiac WDL WDL        Peripheral/Neurovascular WDL    Peripheral Neurovascular WDL WDL        Cognitive/Neuro/Behavioral WDL    Cognitive/Neuro/Behavioral WDL WDL

## 2024-03-09 ENCOUNTER — HEALTH MAINTENANCE LETTER (OUTPATIENT)
Age: 44
End: 2024-03-09

## 2024-07-27 ENCOUNTER — HEALTH MAINTENANCE LETTER (OUTPATIENT)
Age: 44
End: 2024-07-27

## 2024-12-14 ENCOUNTER — HEALTH MAINTENANCE LETTER (OUTPATIENT)
Age: 44
End: 2024-12-14

## 2025-03-16 ENCOUNTER — HEALTH MAINTENANCE LETTER (OUTPATIENT)
Age: 45
End: 2025-03-16

## 2025-06-29 ENCOUNTER — HEALTH MAINTENANCE LETTER (OUTPATIENT)
Age: 45
End: 2025-06-29